# Patient Record
Sex: FEMALE | Race: WHITE | Employment: FULL TIME | URBAN - METROPOLITAN AREA
[De-identification: names, ages, dates, MRNs, and addresses within clinical notes are randomized per-mention and may not be internally consistent; named-entity substitution may affect disease eponyms.]

---

## 2017-07-25 ENCOUNTER — OFFICE VISIT (OUTPATIENT)
Dept: MEDICAL GROUP | Facility: PHYSICIAN GROUP | Age: 52
End: 2017-07-25
Payer: COMMERCIAL

## 2017-07-25 ENCOUNTER — HOSPITAL ENCOUNTER (OUTPATIENT)
Dept: LAB | Facility: MEDICAL CENTER | Age: 52
End: 2017-07-25
Attending: FAMILY MEDICINE
Payer: COMMERCIAL

## 2017-07-25 VITALS
BODY MASS INDEX: 33.06 KG/M2 | TEMPERATURE: 97.9 F | OXYGEN SATURATION: 95 % | DIASTOLIC BLOOD PRESSURE: 92 MMHG | RESPIRATION RATE: 16 BRPM | HEIGHT: 66 IN | WEIGHT: 205.69 LBS | SYSTOLIC BLOOD PRESSURE: 154 MMHG | HEART RATE: 66 BPM

## 2017-07-25 DIAGNOSIS — Z13.220 SCREENING FOR LIPID DISORDERS: ICD-10-CM

## 2017-07-25 DIAGNOSIS — R10.84 GENERALIZED ABDOMINAL PAIN: ICD-10-CM

## 2017-07-25 DIAGNOSIS — Z12.4 CERVICAL CANCER SCREENING: ICD-10-CM

## 2017-07-25 DIAGNOSIS — E66.9 OBESITY (BMI 30-39.9): ICD-10-CM

## 2017-07-25 DIAGNOSIS — Z13.1 SCREENING FOR DIABETES MELLITUS: ICD-10-CM

## 2017-07-25 DIAGNOSIS — Z12.11 SCREENING FOR COLON CANCER: ICD-10-CM

## 2017-07-25 DIAGNOSIS — Z12.39 BREAST CANCER SCREENING: ICD-10-CM

## 2017-07-25 DIAGNOSIS — N95.1 PERIMENOPAUSAL: ICD-10-CM

## 2017-07-25 PROCEDURE — 80061 LIPID PANEL: CPT

## 2017-07-25 PROCEDURE — 84443 ASSAY THYROID STIM HORMONE: CPT

## 2017-07-25 PROCEDURE — 80053 COMPREHEN METABOLIC PANEL: CPT

## 2017-07-25 PROCEDURE — 36415 COLL VENOUS BLD VENIPUNCTURE: CPT

## 2017-07-25 PROCEDURE — 99203 OFFICE O/P NEW LOW 30 MIN: CPT | Performed by: FAMILY MEDICINE

## 2017-07-25 RX ORDER — ESOMEPRAZOLE MAGNESIUM 40 MG/1
40 GRANULE, DELAYED RELEASE ORAL
COMMUNITY
End: 2017-09-29

## 2017-07-25 RX ORDER — FLUTICASONE PROPIONATE 50 MCG
1 SPRAY, SUSPENSION (ML) NASAL 2 TIMES DAILY PRN
COMMUNITY
End: 2021-12-01

## 2017-07-25 RX ORDER — AZELASTINE 1 MG/ML
1 SPRAY, METERED NASAL 2 TIMES DAILY
COMMUNITY
End: 2021-12-01

## 2017-07-25 ASSESSMENT — PATIENT HEALTH QUESTIONNAIRE - PHQ9: CLINICAL INTERPRETATION OF PHQ2 SCORE: 0

## 2017-07-25 NOTE — PROGRESS NOTES
Ana Sherwood is a 52 y.o. female here to establish care and discuss abdominal pain.    HPI: Ana is a pleasant 52-year-old female who is here to establish care. Her previous PCP was in [place]. 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    Generalized abdominal pain  Since her cholecystectomy ~10 years ago, she has had intermittent episodes of abdominal pain. The pain will start somewhere over in her right side and radiate upwards towards her right nipple. It does feel sharp and shooting. Last only for a few seconds to a minute. Denies nausea, vomiting or bowel movement changes. No fever or chills.     She does sometimes have gas and bloating after eating. She is concerned that some of her abdominal muscles may have moved after the surgery.    Perimenopausal  Patient believes that she is going through menopause. Her periods are irregular and she once went 6 months without a period. She's also been having some hot flashes and mood swings. She is unsure when her mother went to menopause.    Current medicines (including changes today)  Current Outpatient Prescriptions   Medication Sig Dispense Refill   • esomeprazole magnesium (NEXIUM) 40 MG Pack Take 40 mg by mouth every morning before breakfast.     • fluticasone (FLONASE) 50 MCG/ACT nasal spray Spray 1 Spray in nose 2 times a day as needed.     • azelastine (ASTELIN) 137 MCG/SPRAY nasal spray [place] 1 Spray in nose 2 times a day.       No current facility-administered medications for this visit.     She  has a past medical history of Uterine fibroid; Allergy; and GERD (gastroesophageal reflux disease). She also has no past medical history of Encounter for long-term (current) use of other medications.  She  has past surgical history that includes bunionectomy; tonsillectomy; and cholecystectomy.  Social History   Substance Use Topics   • Smoking status: Former Smoker     Quit date: 07/18/2017   • Smokeless tobacco: Never Used   • Alcohol Use: Yes  "    Social History     Social History Narrative   • No narrative on file     Family History   Problem Relation Age of Onset   • Hyperlipidemia Mother    • Hypertension Father    • Lung Disease Father    • Cancer Maternal Grandmother      Breast and colon   • Cancer Maternal Grandfather      Prostate and colon   • Cancer Paternal Grandmother      Bone cancer   • Kidney Disease Paternal Grandfather      Family Status   Relation Status Death Age   • Mother Alive    • Father     • Maternal Grandmother     • Maternal Grandfather     • Paternal Grandmother     • Paternal Grandfather       ROS  Constitutional: Negative for fever, chills and malaise/fatigue.   HENT: Negative for congestion.    Eyes: Negative for pain.   Respiratory: Negative for cough and shortness of breath.    Cardiovascular: Negative for leg swelling.   Gastrointestinal: See HPI.  Genitourinary: Negative for dysuria and hematuria.   Skin: Negative for rash.   Neurological: Negative for dizziness, focal weakness and headaches.   Endo/Heme/Allergies: Does not bruise/bleed easily.   Psychiatric/Behavioral: Negative for depression.  The patient is not nervous/anxious.       Objective:     Physical Exam:  Blood pressure 154/92, pulse 66, temperature 36.6 °C (97.9 °F), resp. rate 16, height 1.683 m (5' 6.26\"), weight 93.3 kg (205 lb 11 oz), last menstrual period 2017, SpO2 95 %, not currently breastfeeding. Body mass index is 32.94 kg/(m^2).  Constitutional: Alert, obese, no distress.  Skin: Warm, dry, good turgor, no rashes in visible areas.  Eye: Equal, round and reactive, conjunctiva clear, lids normal.  ENMT: TM's clear bilaterally, lips without lesions, good dentition, oropharynx clear.  Neck: Trachea midline, no masses, no thyromegaly. No cervical or supraclavicular lymphadenopathy.  Respiratory: Unlabored respiratory effort, lungs clear to auscultation, no wheezes, no ronchi.  Cardiovascular: Normal S1, S2, " no murmur, no edema.  Abdomen: Soft, non-tender, no masses, no hepatosplenomegaly.  Psych: Alert and oriented x3, normal affect and mood.    Assessment and Plan:     1. Generalized abdominal pain  This is an ongoing issue for the patient for several years, but is new to me today. Her abdominal exam is reassuring and she does not have any red flags. However, given the chronicity of the pain, we agreed to order an ultrasound to further evaluate. Continue to monitor.  - US-ABDOMEN COMPLETE SURVEY; Future    2. Perimenopausal  Continue to monitor clinically. Advised patient to inform us if she has any heavy vaginal bleeding or any concerning symptoms.    3. Breast cancer screening  Mammogram ordered.  - MA-SCREEN MAMMO W/CAD-BILAT; Future    4. Cervical cancer screening  Records requested for previous Pap smear.    5. Screening for colon cancer  Refer to GI for colonoscopy.  - REFERRAL TO GI FOR COLONOSCOPY    6. Screening for diabetes mellitus  Fasting glucose ordered.  - COMP METABOLIC PANEL; Future    7. Screening for lipid disorders  Fasting cholesterol ordered.  - LIPID PROFILE; Future    8. Obesity (BMI 30-39.9)  Patient's weight was discussed today, including healthy low-carb diet, 30-minutes of moderate exercise daily and avoiding sugars and high-fat foods.  - Patient identified as having weight management issue.  Appropriate orders and counseling given.  - TSH; Future    Records requested from previous gynecologist.  Followup: Return in about 2 months (around 9/25/2017) for f/u results, long if possible.         PLEASE NOTE: This dictation was created using voice recognition software. I have made every reasonable attempt to correct obvious errors, but I expect that there are errors of grammar and possibly content that I did not discover before finalizing the note.

## 2017-07-25 NOTE — ASSESSMENT & PLAN NOTE
Patient believes that she is going through menopause. Her periods are irregular and she once went 6 months without a period. She's also been having some hot flashes and mood swings. She is unsure when her mother went to menopause.

## 2017-07-25 NOTE — Clinical Note
[facility]  Tracy Diaz M.D.  [address]  [place] [address]  Fax: [phone]   Authorization for Release/Disclosure of   Protected Health Information   Name: CRISTOPHER LUCIA : 1965 SSN: [id number]   Address: [address] [place] [address] [place] [address] Phone:    [phone] (home)    I authorize the entity listed below to release/disclose the PHI below to:   [facility]/Tracy Diaz M.D. and Tracy Diaz M.D.   Provider or Entity Name:  Dr. Kenyon  [facility]   Address   [place]   Phone:      Fax:     Reason for request: continuity of care   Information to be released:    [  ] LAST COLONOSCOPY,  including any PATH REPORT and follow-up  [  ] LAST FIT/COLOGUARD RESULT [  ] LAST DEXA  [ x ] LAST MAMMOGRAM  [ x ] LAST PAP  [  ] LAST LABS [  ] RETINA EXAM REPORT  [  ] IMMUNIZATION RECORDS  [  ] Release all info      [  ] Check here and initial the line next to each item to release ALL health information INCLUDING  _____ Care and treatment for drug and / or alcohol abuse  _____ HIV testing, infection status, or AIDS  _____ Genetic Testing    DATES OF SERVICE OR TIME PERIOD TO BE DISCLOSED: _____________  I understand and acknowledge that:  * This Authorization may be revoked at any time by you in writing, except if your health information has already been used or disclosed.  * Your health information that will be used or disclosed as a result of you signing this authorization could be re-disclosed by the recipient. If this occurs, your re-disclosed health information may no longer be protected by State or Federal laws.  * You may refuse to sign this Authorization. Your refusal will not affect your ability to obtain treatment.  * This Authorization becomes effective upon signing and will  on (date) __________.      If no date is indicated, this Authorization will  one (1) year from the signature date.    Name: Cristopher Lucia    Signature:   Date:     7/25/2017       PLEASE FAX REQUESTED RECORDS BACK TO: [phone]

## 2017-07-25 NOTE — ASSESSMENT & PLAN NOTE
Since her cholecystectomy ~10 years ago, she has had intermittent episodes of abdominal pain. The pain will start somewhere over in her right side and radiate upwards towards her right nipple. It does feel sharp and shooting. Last only for a few seconds to a minute. Denies nausea, vomiting or bowel movement changes. No fever or chills.     She does sometimes have gas and bloating after eating. She is concerned that some of her abdominal muscles may have moved after the surgery.

## 2017-07-25 NOTE — MR AVS SNAPSHOT
"        Ana Sherwood   2017 9:20 AM   Office Visit   MRN: [id number]    Department:  [facility]   Dept Phone:  [phone]    Description:  Female : 1965   Provider:  Tracy Diaz M.D.           Reason for Visit     Referral Needed PAP, mammogram, colonoscopy, lab order request     Nicotine Dependence           Allergies as of 2017     Allergen Noted Reactions    Codeine 2017   Vomiting, Nausea    Vicodin [Hydrocodone-Acetaminophen] 2017   Nausea    Small doses with food - patient tolerates       You were diagnosed with     Generalized abdominal pain   [173929]       Perimenopausal   [655171]       Breast cancer screening   [089873]       Cervical cancer screening   [510119]       Screening for colon cancer   [151463]       Screening for diabetes mellitus   [V77.1.ICD-9-CM]       Screening for lipid disorders   [9043866]       Obesity (BMI 30-39.9)   [361915]         Vital Signs     Blood Pressure Pulse Temperature Respirations Height Weight    154/92 mmHg 66 36.6 °C (97.9 °F) 16 1.683 m (5' 6.26\") 93.3 kg (205 lb 11 oz)    Body Mass Index Oxygen Saturation Last Menstrual Period Breastfeeding? Smoking Status       32.94 kg/m2 95% 2017 No Former Smoker       Basic Information     Date Of Birth Sex Race Ethnicity Preferred Language    1965 Female White Non- English      Your appointments     Sep 29, 2017  4:20 PM   Established Patient with Tracy Diaz M.D.   [facility] (--)    [address]  [address]  [facility] [id number] [phone]           You will be receiving a confirmation call a few days before your appointment from our automated call confirmation system.              Problem List              ICD-10-CM Priority Class Noted - Resolved    Perimenopausal N95.1   2017 - Present    Obesity (BMI 30-39.9) E66.9   2017 - Present    Generalized abdominal pain R10.84   2017 - Present      Health Maintenance        Date " Due Completion Dates    PAP SMEAR 1/31/1988 ---    MAMMOGRAM 1/31/2007 ---    COLONOSCOPY 1/31/2017 ---    IMM DTaP/Tdap/Td Vaccine (1 - Tdap) 7/27/2018 (Originally 1/31/1986) ---    IMM INFLUENZA (1) 9/1/2017 ---            Current Immunizations     No immunizations on file.      Below and/or attached are the medications your provider expects you to take. Review all of your home medications and newly ordered medications with your provider and/or pharmacist. Follow medication instructions as directed by your provider and/or pharmacist. Please keep your medication list with you and share with your provider. Update the information when medications are discontinued, doses are changed, or new medications (including over-the-counter products) are added; and carry medication information at all times in the event of emergency situations     Allergies:  CODEINE - Vomiting,Nausea     VICODIN - Nausea               Medications  Valid as of: July 25, 2017 -  9:58 AM    Generic Name Brand Name Tablet Size Instructions for use    Azelastine HCl (Solution) ASTELIN 137 MCG/SPRAY Spray 1 Spray in nose 2 times a day.        Esomeprazole Magnesium (Pack) NEXIUM 40 MG Take 40 mg by mouth every morning before breakfast.        Fluticasone Propionate (Suspension) FLONASE 50 MCG/ACT Spray 1 Spray in nose 2 times a day as needed.        .                 Medicines prescribed today were sent to:     None      Medication refill instructions:       If your prescription bottle indicates you have medication refills left, it is not necessary to call your provider’s office. Please contact your pharmacy and they will refill your medication.    If your prescription bottle indicates you do not have any refills left, you may request refills at any time through one of the following ways: The online [employer] system (except Urgent Care), by calling your provider’s office, or by asking your pharmacy to contact your provider’s office with a refill  request. Medication refills are processed only during regular business hours and may not be available until the next business day. Your provider may request additional information or to have a follow-up visit with you prior to refilling your medication.   *Please Note: Medication refills are assigned a new Rx number when refilled electronically. Your pharmacy may indicate that no refills were authorized even though a new prescription for the same medication is available at the pharmacy. Please request the medicine by name with the pharmacy before contacting your provider for a refill.        Your To Do List     Future Labs/Procedures Complete By Expires    COMP METABOLIC PANEL  As directed 7/26/2018    LIPID PROFILE  As directed 7/26/2018    MA-SCREEN MAMMO W/CAD-BILAT  As directed 8/26/2018    TSH  As directed 7/26/2018    US-ABDOMEN COMPLETE SURVEY  As directed 1/25/2018      Referral     A referral request has been sent to our patient care coordination department. Please allow 3-5 business days for us to process this request and contact you either by phone or mail. If you do not hear from us by the 5th business day, please call us at [phone].           [employer] Access Code: Activation code not generated  Current [employer] Status: Active

## 2017-07-26 LAB
ALBUMIN SERPL BCP-MCNC: 4.3 G/DL (ref 3.2–4.9)
ALBUMIN/GLOB SERPL: 1.3 G/DL
ALP SERPL-CCNC: 71 U/L (ref 30–99)
ALT SERPL-CCNC: 28 U/L (ref 2–50)
ANION GAP SERPL CALC-SCNC: 11 MMOL/L (ref 0–11.9)
AST SERPL-CCNC: 30 U/L (ref 12–45)
BILIRUB SERPL-MCNC: 0.6 MG/DL (ref 0.1–1.5)
BUN SERPL-MCNC: 16 MG/DL (ref 8–22)
CALCIUM SERPL-MCNC: 9.8 MG/DL (ref 8.5–10.5)
CHLORIDE SERPL-SCNC: 102 MMOL/L (ref 96–112)
CHOLEST SERPL-MCNC: 237 MG/DL (ref 100–199)
CO2 SERPL-SCNC: 22 MMOL/L (ref 20–33)
CREAT SERPL-MCNC: 0.62 MG/DL (ref 0.5–1.4)
GFR SERPL CREATININE-BSD FRML MDRD: >60 ML/MIN/1.73 M 2
GLOBULIN SER CALC-MCNC: 3.2 G/DL (ref 1.9–3.5)
GLUCOSE SERPL-MCNC: 67 MG/DL (ref 65–99)
HDLC SERPL-MCNC: 52 MG/DL
LDLC SERPL CALC-MCNC: ABNORMAL MG/DL
POTASSIUM SERPL-SCNC: 4.1 MMOL/L (ref 3.6–5.5)
PROT SERPL-MCNC: 7.5 G/DL (ref 6–8.2)
SODIUM SERPL-SCNC: 135 MMOL/L (ref 135–145)
TRIGL SERPL-MCNC: 453 MG/DL (ref 0–149)
TSH SERPL DL<=0.005 MIU/L-ACNC: 2.61 UIU/ML (ref 0.3–3.7)

## 2017-09-12 ENCOUNTER — HOSPITAL ENCOUNTER (OUTPATIENT)
Dept: RADIOLOGY | Facility: MEDICAL CENTER | Age: 52
End: 2017-09-12
Attending: FAMILY MEDICINE
Payer: COMMERCIAL

## 2017-09-12 DIAGNOSIS — Z12.31 VISIT FOR SCREENING MAMMOGRAM: ICD-10-CM

## 2017-09-12 PROCEDURE — G0202 SCR MAMMO BI INCL CAD: HCPCS

## 2017-09-15 ENCOUNTER — HOSPITAL ENCOUNTER (OUTPATIENT)
Dept: RADIOLOGY | Facility: MEDICAL CENTER | Age: 52
End: 2017-09-15

## 2017-09-15 ENCOUNTER — HOSPITAL ENCOUNTER (OUTPATIENT)
Dept: RADIOLOGY | Facility: MEDICAL CENTER | Age: 52
End: 2017-09-15
Attending: INTERNAL MEDICINE
Payer: COMMERCIAL

## 2017-09-15 DIAGNOSIS — D12.0 BENIGN NEOPLASM OF CECUM: ICD-10-CM

## 2017-09-15 PROCEDURE — 74177 CT ABD & PELVIS W/CONTRAST: CPT

## 2017-09-15 PROCEDURE — 700117 HCHG RX CONTRAST REV CODE 255: Performed by: INTERNAL MEDICINE

## 2017-09-15 RX ADMIN — IOHEXOL 100 ML: 350 INJECTION, SOLUTION INTRAVENOUS at 18:17

## 2017-09-29 ENCOUNTER — OFFICE VISIT (OUTPATIENT)
Dept: MEDICAL GROUP | Facility: PHYSICIAN GROUP | Age: 52
End: 2017-09-29
Payer: COMMERCIAL

## 2017-09-29 VITALS
HEIGHT: 66 IN | WEIGHT: 211 LBS | SYSTOLIC BLOOD PRESSURE: 146 MMHG | TEMPERATURE: 98.2 F | OXYGEN SATURATION: 96 % | HEART RATE: 69 BPM | BODY MASS INDEX: 33.91 KG/M2 | DIASTOLIC BLOOD PRESSURE: 86 MMHG

## 2017-09-29 DIAGNOSIS — E78.00 PURE HYPERCHOLESTEROLEMIA: ICD-10-CM

## 2017-09-29 DIAGNOSIS — R10.84 GENERALIZED ABDOMINAL PAIN: ICD-10-CM

## 2017-09-29 DIAGNOSIS — K21.9 GASTROESOPHAGEAL REFLUX DISEASE WITHOUT ESOPHAGITIS: ICD-10-CM

## 2017-09-29 DIAGNOSIS — K63.5 HYPERPLASTIC POLYP OF DESCENDING COLON: ICD-10-CM

## 2017-09-29 PROCEDURE — 99214 OFFICE O/P EST MOD 30 MIN: CPT | Performed by: FAMILY MEDICINE

## 2017-09-29 RX ORDER — RANITIDINE 150 MG/1
150 TABLET ORAL 2 TIMES DAILY
Qty: 60 TAB | Refills: 0 | Status: SHIPPED | OUTPATIENT
Start: 2017-09-29 | End: 2017-12-29

## 2017-09-29 NOTE — ASSESSMENT & PLAN NOTE
Patient has not had any pain since her last appointment. She had a colonoscopy and had two polyps removed. Pathology came back as hyperplastic polyps. Due to the location of the polyps, she had a CT abdomen/pelvis done which was unremarkable. As she had a CT, she did not have her ultrasound.     Again, she mentions that her pain has resolved. She thinks it was musculoskeletal as it improved once she resumed working out at the gym.

## 2017-10-01 NOTE — ASSESSMENT & PLAN NOTE
Patient's blood pressure is noted to be elevated. It was also high at her appointment in July. She tells me that when she checks it at "[employer] on their machine it is normal. She had [employer] for breakfast this morning.    Denies headaches, vision changes, chest pain or shortness of breath.

## 2017-10-01 NOTE — PROGRESS NOTES
Subjective:   Cristopher Sherwood is a 52 y.o. female here today for follow-up abdominal pain and lab results.    Generalized abdominal pain  Patient has not had any pain since her last appointment. She had a colonoscopy and had two polyps removed. Pathology came back as hyperplastic polyps. Due to the location of the polyps, she had a CT abdomen/pelvis done which was unremarkable. As she had a CT, she did not have her ultrasound.     Again, she mentions that her pain has resolved. She thinks it was musculoskeletal as it improved once she resumed working out at the gym.    Elevated blood pressure  Patient's blood pressure is noted to be elevated. It was also high at her appointment in July. She tells me that when she checks it at [employer] on their machine it is normal. She had [employer] for breakfast this morning.    Denies headaches, vision changes, chest pain or shortness of breath.    Gastroesophageal reflux disease without esophagitis  Taking medication daily. No early satiety, unintentional weight loss, choking, persistent burning pain in chest or upper abdomen. She is requesting a refill of esomeprazole today.    Pure hypercholesterolemia  This is a new problem. We reviewed patient's lipid panel which was abnormal. Denies personal history of heart attack or stroke. She has started to smoke again.    Results for CRISTOPHER SHERWOOD (MRN [id number]) as of 9/30/2017 20:06   Ref. Range 7/25/2017 10:06   Cholesterol,Tot Latest Ref Range: 100 - 199 mg/dL 237 (H)   Triglycerides Latest Ref Range: 0 - 149 mg/dL 453 (H)   HDL Latest Ref Range: >=40 mg/dL 52   LDL Latest Ref Range: <100 mg/dL see below      Current medicines (including changes today)  Current Outpatient Prescriptions   Medication Sig Dispense Refill   • ranitidine (ZANTAC) 150 MG Tab Take 1 Tab by mouth 2 times a day. 60 Tab 0   • fluticasone (FLONASE) 50 MCG/ACT nasal spray Spray 1 Spray in nose 2 times a day as needed.     • azelastine (ASTELIN)  "137 MCG/SPRAY nasal spray [place] 1 Spray in nose 2 times a day.       No current facility-administered medications for this visit.      She  has a past medical history of Allergy; GERD (gastroesophageal reflux disease); and Uterine fibroid. She also has no past medical history of Encounter for long-term (current) use of other medications.    ROS   See HPI. No chest pain, no shortness of breath, no abdominal pain.     Objective:     Physical Exam:  Blood pressure 146/86, pulse 69, temperature 36.8 °C (98.2 °F), height 1.676 m (5' 6\"), weight 95.7 kg (211 lb), SpO2 96 %. Body mass index is 34.06 kg/m².   Constitutional: Alert, obese, no distress.  Skin: Warm, dry, good turgor, no rashes in visible areas.  Eye: Equal, round and reactive, conjunctiva clear, lids normal.  ENMT: Lips without lesions, good dentition, oropharynx clear.  Neck: Trachea midline, no masses, no thyromegaly.  Respiratory: Unlabored respiratory effort, lungs clear to auscultation, no wheezes, no ronchi.  Cardiovascular: Normal S1, S2, no murmur, no edema.  Abdomen: Soft, non-tender, no masses, no hepatosplenomegaly.  Psych: Alert and oriented x3, normal affect and mood.    Assessment and Plan:     1. Generalized abdominal pain  Resolved. Reviewed unremarkable work-up.    2. Hyperplastic polyp of descending colon  Two hyperplastic polyps removed during colonoscopy. Recall in 5 years.    3. Elevated blood pressure  BP remained elevated above goal on recheck. Lengthy discussion with patient regarding DASH diet, importance of exercise and smoking cessation. Close follow-up. If BP >140/80, will plan to start lisinopril.    4. Gastroesophageal reflux disease without esophagitis  Chronic and stable. Transition to H2 blocker and monitor.  - ranitidine (ZANTAC) 150 MG Tab; Take 1 Tab by mouth 2 times a day.  Dispense: 60 Tab; Refill: 0    5. Pure hypercholesterolemia  This is a new problem. Her 10-year ASCVD risk is 2.5 to 8.5% (depending on tobacco use and " elevated BP). Close follow-up to recheck BP and re-assess risk.    Followup: Return in about 2 months (around 11/29/2017) for f/u blood pressure, short.         PLEASE NOTE: This dictation was created using voice recognition software. I have made every reasonable attempt to correct obvious errors, but I expect that there are errors of grammar and possibly content that I did not discover before finalizing the note.

## 2017-10-01 NOTE — ASSESSMENT & PLAN NOTE
This is a new problem. We reviewed patient's lipid panel which was abnormal. Denies personal history of heart attack or stroke. She has started to smoke again.    Results for CRISTOPHER LUCIA (MRN [id number]) as of 9/30/2017 20:06   Ref. Range 7/25/2017 10:06   Cholesterol,Tot Latest Ref Range: 100 - 199 mg/dL 237 (H)   Triglycerides Latest Ref Range: 0 - 149 mg/dL 453 (H)   HDL Latest Ref Range: >=40 mg/dL 52   LDL Latest Ref Range: <100 mg/dL see below

## 2017-10-01 NOTE — ASSESSMENT & PLAN NOTE
Taking medication daily. No early satiety, unintentional weight loss, choking, persistent burning pain in chest or upper abdomen. She is requesting a refill of esomeprazole today.

## 2017-12-29 ENCOUNTER — OFFICE VISIT (OUTPATIENT)
Dept: MEDICAL GROUP | Facility: PHYSICIAN GROUP | Age: 52
End: 2017-12-29
Payer: COMMERCIAL

## 2017-12-29 VITALS
HEART RATE: 64 BPM | SYSTOLIC BLOOD PRESSURE: 136 MMHG | DIASTOLIC BLOOD PRESSURE: 84 MMHG | OXYGEN SATURATION: 96 % | RESPIRATION RATE: 14 BRPM | HEIGHT: 66 IN | TEMPERATURE: 98.9 F | WEIGHT: 217 LBS | BODY MASS INDEX: 34.87 KG/M2

## 2017-12-29 DIAGNOSIS — E78.00 PURE HYPERCHOLESTEROLEMIA: ICD-10-CM

## 2017-12-29 DIAGNOSIS — K21.9 GASTROESOPHAGEAL REFLUX DISEASE WITHOUT ESOPHAGITIS: ICD-10-CM

## 2017-12-29 DIAGNOSIS — E66.9 OBESITY (BMI 30-39.9): ICD-10-CM

## 2017-12-29 DIAGNOSIS — I10 ESSENTIAL HYPERTENSION: ICD-10-CM

## 2017-12-29 PROBLEM — R10.84 GENERALIZED ABDOMINAL PAIN: Status: RESOLVED | Noted: 2017-07-25 | Resolved: 2017-12-29

## 2017-12-29 PROCEDURE — 99214 OFFICE O/P EST MOD 30 MIN: CPT | Performed by: FAMILY MEDICINE

## 2017-12-29 RX ORDER — LOSARTAN POTASSIUM 25 MG/1
25 TABLET ORAL DAILY
Qty: 30 TAB | Refills: 2 | Status: SHIPPED | OUTPATIENT
Start: 2017-12-29 | End: 2018-02-11 | Stop reason: SDUPTHER

## 2017-12-29 RX ORDER — ESOMEPRAZOLE MAGNESIUM 40 MG/1
40 CAPSULE, DELAYED RELEASE ORAL
Qty: 90 CAP | Refills: 3 | Status: SHIPPED | OUTPATIENT
Start: 2017-12-29 | End: 2017-12-29 | Stop reason: SDUPTHER

## 2017-12-29 RX ORDER — ESOMEPRAZOLE MAGNESIUM 40 MG/1
40 CAPSULE, DELAYED RELEASE ORAL
Qty: 30 CAP | Refills: 2 | Status: SHIPPED | OUTPATIENT
Start: 2017-12-29 | End: 2018-02-11 | Stop reason: SDUPTHER

## 2017-12-29 ASSESSMENT — PAIN SCALES - GENERAL: PAINLEVEL: NO PAIN

## 2017-12-30 NOTE — ASSESSMENT & PLAN NOTE
Stable. We discussed starting a cholesterol-lowering medication as we will be starting a medication for blood pressure. Patient is hesitant to start a cholesterol medication as she has had some side effects in the past. She also mentions that her cholesterol numbers looked worse on treatment. Denies personal history of heart attack or stroke.    Results for CRISTOPHER LUCIA (MRN [id number]) as of 9/30/2017 20:06   Ref. Range 7/25/2017 10:06   Cholesterol,Tot Latest Ref Range: 100 - 199 mg/dL 237 (H)   Triglycerides Latest Ref Range: 0 - 149 mg/dL 453 (H)   HDL Latest Ref Range: >=40 mg/dL 52   LDL Latest Ref Range: <100 mg/dL see below

## 2017-12-30 NOTE — ASSESSMENT & PLAN NOTE
"Patient's blood pressure is noted to be elevated today. She tells me that she has been checking at outside the office at her local pharmacy and it has been \"high.\" She states that she has been getting some readings into the 150s-160 systolic. She has also been having some headaches which are new for her.    A few years ago, she was under a great detail of stress at work and was having issues with her blood pressure. However, she has never needed to go on medication and has never been diagnosed with hypertension previously.  "

## 2017-12-30 NOTE — ASSESSMENT & PLAN NOTE
"Stable. Patient tried taking ranitidine without much improvement in her symptoms. Instead, she noticed having \"bad burps\" and stopped the medication. She has restarted over-the-counter Nexium and requests a prescription. No early satiety, unintentional weight loss, choking, persistent burning pain in chest or upper abdomen.    "

## 2017-12-30 NOTE — PROGRESS NOTES
"Subjective:   Cristopher Sherwood is a 52 y.o. female here today for blood pressure check and follow-up acid reflux.    Essential hypertension  Patient's blood pressure is noted to be elevated today. She tells me that she has been checking at outside the office at her local pharmacy and it has been \"high.\" She states that she has been getting some readings into the 150s-160 systolic. She has also been having some headaches which are new for her.    A few years ago, she was under a great detail of stress at work and was having issues with her blood pressure. However, she has never needed to go on medication and has never been diagnosed with hypertension previously.    Gastroesophageal reflux disease without esophagitis  Stable. Patient tried taking ranitidine without much improvement in her symptoms. Instead, she noticed having \"bad burps\" and stopped the medication. She has restarted over-the-counter Nexium and requests a prescription. No early satiety, unintentional weight loss, choking, persistent burning pain in chest or upper abdomen.    Pure hypercholesterolemia  Stable. We discussed starting a cholesterol-lowering medication as we will be starting a medication for blood pressure. Patient is hesitant to start a cholesterol medication as she has had some side effects in the past. She also mentions that her cholesterol numbers looked worse on treatment. Denies personal history of heart attack or stroke.    Results for CRISTOPHER SHERWOOD (MRN [id number]) as of 9/30/2017 20:06   Ref. Range 7/25/2017 10:06   Cholesterol,Tot Latest Ref Range: 100 - 199 mg/dL 237 (H)   Triglycerides Latest Ref Range: 0 - 149 mg/dL 453 (H)   HDL Latest Ref Range: >=40 mg/dL 52   LDL Latest Ref Range: <100 mg/dL see below      Current medicines (including changes today)  Current Outpatient Prescriptions   Medication Sig Dispense Refill   • losartan (COZAAR) 25 MG Tab Take 1 Tab by mouth every day. 30 Tab 2   • esomeprazole (NEXIUM) " "40 MG delayed-release capsule Take 1 Cap by mouth every morning before breakfast. 30 Cap 2   • fluticasone (FLONASE) 50 MCG/ACT nasal spray Spray 1 Spray in nose 2 times a day as needed.     • azelastine (ASTELIN) 137 MCG/SPRAY nasal spray [employer] 1 Spray in nose 2 times a day.       No current facility-administered medications for this visit.      She  has a past medical history of Allergy; GERD (gastroesophageal reflux disease); and Uterine fibroid. She also has no past medical history of Encounter for long-term (current) use of other medications.    ROS   See HPI. No chest pain, no shortness of breath, no abdominal pain.     Objective:     Physical Exam:  Blood pressure 136/84, pulse 64, temperature 37.2 °C (98.9 °F), resp. rate 14, height 1.676 m (5' 6\"), weight 98.4 kg (217 lb), last menstrual period 12/20/2017, SpO2 96 %, not currently breastfeeding. Body mass index is 35.02 kg/m².   Repeat blood pressure 148/92.  Constitutional: Alert, overweight, no distress.  Skin: Warm, dry, good turgor, no rashes in visible areas.  Eye: Equal, round and reactive, conjunctiva clear, lids normal.  ENMT: Lips without lesions, good dentition, oropharynx clear.  Neck: Trachea midline, no masses, no thyromegaly.  Respiratory: Unlabored respiratory effort, lungs clear to auscultation, no wheezes, no ronchi.  Cardiovascular: Normal S1, S2, no murmur, no edema.  Psych: Alert and oriented x3, normal affect and mood.    Assessment and Plan:     1. Essential hypertension  This is a new problem. Patient's blood pressure is elevated in the office and she reports high numbers outside as well. Start low-dose of losartan. Discussed decreasing salt intake. Emphasized benefits of exercise and diet. Continue to monitor and follow-up in office in 6 weeks.  - losartan (COZAAR) 25 MG Tab; Take 1 Tab by mouth every day.  Dispense: 30 Tab; Refill: 2  - COMP METABOLIC PANEL; Future    2. Gastroesophageal reflux disease without esophagitis  This is a " chronic and stable problem. Patient is doing well. She did not tolerate H2-blocker trial. No red flags. Continue PPI and monitor.  - esomeprazole (NEXIUM) 40 MG delayed-release capsule; Take 1 Cap by mouth every morning before breakfast. Dispense: 30 Cap; Refill: 2    3. Pure hypercholesterolemia  Stable. Her 10-year ASCVD risk is 7%, possibly higher as patient's triglycerides were elevated and LDL could not be calculated. I recommend she start statin therapy, but she declines today. We agreed to treat with lifestyle modifications and recheck lipid panel before next appointment.   - LIPID PROFILE; Future    4. Obesity (BMI 30-39.9)  Patient's weight was discussed today, including healthy low-carb diet, 30-minutes of moderate exercise daily and avoiding sugars and high-fat foods.  - Patient identified as having weight management issue.  Appropriate orders and counseling given.    Followup: Return in about 6 weeks (around 2/9/2018) for HTN, lab results, short.         PLEASE NOTE: This dictation was created using voice recognition software. I have made every reasonable attempt to correct obvious errors, but I expect that there are errors of grammar and possibly content that I did not discover before finalizing the note.

## 2018-02-09 ENCOUNTER — APPOINTMENT (OUTPATIENT)
Dept: MEDICAL GROUP | Facility: PHYSICIAN GROUP | Age: 53
End: 2018-02-09
Payer: COMMERCIAL

## 2018-02-09 ENCOUNTER — TELEPHONE (OUTPATIENT)
Dept: MEDICAL GROUP | Facility: PHYSICIAN GROUP | Age: 53
End: 2018-02-09

## 2018-02-10 NOTE — TELEPHONE ENCOUNTER
Patient had an apt with [facility] for 2/9, but was 20 minutes late and unable to be seen. We rescheduled her apt for 3/30 with Dr. Diaz. However the patient is concerned they won't have enough BP medication and was wanting a refill. Please advice and call patient if they need to be seen sooner. Thank you!

## 2018-02-11 DIAGNOSIS — I10 ESSENTIAL HYPERTENSION: ICD-10-CM

## 2018-02-11 DIAGNOSIS — K21.9 GASTROESOPHAGEAL REFLUX DISEASE WITHOUT ESOPHAGITIS: ICD-10-CM

## 2018-02-11 RX ORDER — ESOMEPRAZOLE MAGNESIUM 40 MG/1
40 CAPSULE, DELAYED RELEASE ORAL
Qty: 30 CAP | Refills: 2 | Status: SHIPPED | OUTPATIENT
Start: 2018-02-11 | End: 2018-07-29 | Stop reason: SDUPTHER

## 2018-02-11 RX ORDER — LOSARTAN POTASSIUM 25 MG/1
25 TABLET ORAL DAILY
Qty: 30 TAB | Refills: 2 | Status: SHIPPED | OUTPATIENT
Start: 2018-02-11 | End: 2018-03-30

## 2018-03-30 ENCOUNTER — OFFICE VISIT (OUTPATIENT)
Dept: MEDICAL GROUP | Facility: PHYSICIAN GROUP | Age: 53
End: 2018-03-30
Payer: COMMERCIAL

## 2018-03-30 VITALS
WEIGHT: 215 LBS | SYSTOLIC BLOOD PRESSURE: 144 MMHG | OXYGEN SATURATION: 97 % | RESPIRATION RATE: 18 BRPM | DIASTOLIC BLOOD PRESSURE: 80 MMHG | TEMPERATURE: 98.6 F | BODY MASS INDEX: 34.55 KG/M2 | HEART RATE: 66 BPM | HEIGHT: 66 IN

## 2018-03-30 DIAGNOSIS — I10 ESSENTIAL HYPERTENSION: ICD-10-CM

## 2018-03-30 DIAGNOSIS — J30.89 ENVIRONMENTAL AND SEASONAL ALLERGIES: ICD-10-CM

## 2018-03-30 DIAGNOSIS — E78.00 PURE HYPERCHOLESTEROLEMIA: ICD-10-CM

## 2018-03-30 PROCEDURE — 99214 OFFICE O/P EST MOD 30 MIN: CPT | Performed by: FAMILY MEDICINE

## 2018-03-30 RX ORDER — LOSARTAN POTASSIUM 50 MG/1
50 TABLET ORAL DAILY
Qty: 90 TAB | Refills: 3 | Status: SHIPPED | OUTPATIENT
Start: 2018-03-30 | End: 2019-04-07 | Stop reason: SDUPTHER

## 2018-03-30 ASSESSMENT — PAIN SCALES - GENERAL: PAINLEVEL: NO PAIN

## 2018-03-30 NOTE — ASSESSMENT & PLAN NOTE
Elevated. Monitoring BP at home. She tells me that her BP have been in the high 140's at home. Currently taking losartan 25mg daily as directed. Also taking baby aspirin. Denies lightheadedness, vision changes, headache, palpitations or leg swelling.

## 2018-03-30 NOTE — ASSESSMENT & PLAN NOTE
"Patient says she has had the \"funk\" a couple times this year. She was treated for bronchitis at work. Has multiple allergies and recently restarted her Flonase.  "

## 2018-03-30 NOTE — ASSESSMENT & PLAN NOTE
Stable. At previous visits, we discussed starting a cholesterol-lowering medication. Patient is hesitant to start a cholesterol medication as she has had some side effects in the past. Denies personal history of heart attack or stroke.    We reviewed her most recent results.    Results for KHARI CRISTOPHER PEREZ (MRN [id number]) as of 9/30/2017 20:06   Ref. Range 7/25/2017 10:06   Cholesterol,Tot Latest Ref Range: 100 - 199 mg/dL 237 (H)   Triglycerides Latest Ref Range: 0 - 149 mg/dL 453 (H)   HDL Latest Ref Range: >=40 mg/dL 52   LDL Latest Ref Range: <100 mg/dL see below

## 2018-07-29 DIAGNOSIS — K21.9 GASTROESOPHAGEAL REFLUX DISEASE WITHOUT ESOPHAGITIS: ICD-10-CM

## 2018-07-30 RX ORDER — ESOMEPRAZOLE MAGNESIUM 40 MG/1
CAPSULE, DELAYED RELEASE ORAL
Qty: 30 CAP | Refills: 11 | Status: SHIPPED | OUTPATIENT
Start: 2018-07-30 | End: 2021-12-10 | Stop reason: SDUPTHER

## 2018-10-25 ENCOUNTER — HOSPITAL ENCOUNTER (OUTPATIENT)
Dept: LAB | Facility: MEDICAL CENTER | Age: 53
End: 2018-10-25
Attending: FAMILY MEDICINE
Payer: COMMERCIAL

## 2018-10-25 ENCOUNTER — TELEPHONE (OUTPATIENT)
Dept: MEDICAL GROUP | Facility: PHYSICIAN GROUP | Age: 53
End: 2018-10-25

## 2018-10-25 DIAGNOSIS — J30.89 ENVIRONMENTAL AND SEASONAL ALLERGIES: ICD-10-CM

## 2018-10-25 DIAGNOSIS — I10 ESSENTIAL HYPERTENSION: ICD-10-CM

## 2018-10-25 DIAGNOSIS — E78.00 PURE HYPERCHOLESTEROLEMIA: ICD-10-CM

## 2018-10-25 LAB
ALBUMIN SERPL BCP-MCNC: 4.8 G/DL (ref 3.2–4.9)
ALBUMIN/GLOB SERPL: 1.7 G/DL
ALP SERPL-CCNC: 69 U/L (ref 30–99)
ALT SERPL-CCNC: 67 U/L (ref 2–50)
ANION GAP SERPL CALC-SCNC: 7 MMOL/L (ref 0–11.9)
AST SERPL-CCNC: 48 U/L (ref 12–45)
BILIRUB SERPL-MCNC: 0.7 MG/DL (ref 0.1–1.5)
BUN SERPL-MCNC: 18 MG/DL (ref 8–22)
CALCIUM SERPL-MCNC: 10 MG/DL (ref 8.5–10.5)
CHLORIDE SERPL-SCNC: 103 MMOL/L (ref 96–112)
CHOLEST SERPL-MCNC: 296 MG/DL (ref 100–199)
CO2 SERPL-SCNC: 27 MMOL/L (ref 20–33)
CREAT SERPL-MCNC: 0.72 MG/DL (ref 0.5–1.4)
FASTING STATUS PATIENT QL REPORTED: NORMAL
GLOBULIN SER CALC-MCNC: 2.9 G/DL (ref 1.9–3.5)
GLUCOSE SERPL-MCNC: 96 MG/DL (ref 65–99)
HDLC SERPL-MCNC: 49 MG/DL
LDLC SERPL CALC-MCNC: 186 MG/DL
POTASSIUM SERPL-SCNC: 3.8 MMOL/L (ref 3.6–5.5)
PROT SERPL-MCNC: 7.7 G/DL (ref 6–8.2)
SODIUM SERPL-SCNC: 137 MMOL/L (ref 135–145)
TRIGL SERPL-MCNC: 307 MG/DL (ref 0–149)

## 2018-10-25 PROCEDURE — 80061 LIPID PANEL: CPT

## 2018-10-25 PROCEDURE — 36415 COLL VENOUS BLD VENIPUNCTURE: CPT

## 2018-10-25 PROCEDURE — 80053 COMPREHEN METABOLIC PANEL: CPT

## 2018-10-25 NOTE — TELEPHONE ENCOUNTER
Phone Number Called: [phone] (home)     Message: LVM to call back.     Left Message for patient to call back: yes

## 2018-10-25 NOTE — TELEPHONE ENCOUNTER
Pt came into the clinic to have labs drawn but the order was not for an annual and pt didn't want to be billed. She went ahead and got her blood drawn in hopes it can be changed. Please call with any questions.

## 2018-10-25 NOTE — TELEPHONE ENCOUNTER
"Noted.  Diagnoses on ordered labs cannot be changed as they are for established problems \"essential hypertension\" and \"pure hypercholesterolemia\".  Please refer to March 2018 note.  -Tracy Diaz M.D.    "

## 2018-10-25 NOTE — TELEPHONE ENCOUNTER
Patient returned call and states she has never been billed in the past for annual labs and has had the same diagnoses for years.  I did inform her that this is on her problem list and was discussed during last office visit therefore this will be used as diagnosis.  I suggested that patient not sign ABN form and contact us prior to having labs done next time otherwise she will be responsible for any bills that may accrue. Patient states she will discuss labs with PCP on Friday.

## 2018-10-29 RX ORDER — CYANOCOBALAMIN 1000 UG/ML
1000 INJECTION, SOLUTION INTRAMUSCULAR; SUBCUTANEOUS ONCE
Status: CANCELLED | OUTPATIENT
Start: 2018-10-29 | End: 2018-10-30

## 2018-10-29 NOTE — PROGRESS NOTES
I've never discussed B12 deficiency with this patient.  Please check this order.  -Tracy Diaz M.D.

## 2018-11-02 ENCOUNTER — OFFICE VISIT (OUTPATIENT)
Dept: MEDICAL GROUP | Facility: PHYSICIAN GROUP | Age: 53
End: 2018-11-02
Payer: COMMERCIAL

## 2018-11-02 ENCOUNTER — HOSPITAL ENCOUNTER (OUTPATIENT)
Dept: RADIOLOGY | Facility: MEDICAL CENTER | Age: 53
End: 2018-11-02
Attending: FAMILY MEDICINE
Payer: COMMERCIAL

## 2018-11-02 VITALS
RESPIRATION RATE: 16 BRPM | TEMPERATURE: 99.1 F | DIASTOLIC BLOOD PRESSURE: 62 MMHG | WEIGHT: 215 LBS | OXYGEN SATURATION: 96 % | HEART RATE: 74 BPM | HEIGHT: 66 IN | BODY MASS INDEX: 34.55 KG/M2 | SYSTOLIC BLOOD PRESSURE: 100 MMHG

## 2018-11-02 DIAGNOSIS — Z98.890 HISTORY OF FOOT SURGERY: ICD-10-CM

## 2018-11-02 DIAGNOSIS — Z12.39 SCREENING BREAST EXAMINATION: ICD-10-CM

## 2018-11-02 DIAGNOSIS — K21.9 GASTROESOPHAGEAL REFLUX DISEASE WITHOUT ESOPHAGITIS: ICD-10-CM

## 2018-11-02 DIAGNOSIS — Z00.00 PREVENTATIVE HEALTH CARE: ICD-10-CM

## 2018-11-02 DIAGNOSIS — I10 ESSENTIAL HYPERTENSION: ICD-10-CM

## 2018-11-02 DIAGNOSIS — E78.00 PURE HYPERCHOLESTEROLEMIA: ICD-10-CM

## 2018-11-02 DIAGNOSIS — M79.671 RIGHT FOOT PAIN: ICD-10-CM

## 2018-11-02 PROCEDURE — 77067 SCR MAMMO BI INCL CAD: CPT

## 2018-11-02 PROCEDURE — 99214 OFFICE O/P EST MOD 30 MIN: CPT | Performed by: FAMILY MEDICINE

## 2018-11-02 ASSESSMENT — PATIENT HEALTH QUESTIONNAIRE - PHQ9: CLINICAL INTERPRETATION OF PHQ2 SCORE: 0

## 2018-11-03 NOTE — PROGRESS NOTES
"Subjective:   Ana Sherwood is a 53 y.o. female here today for follow-up hypertension, lab results and right foot pain.    Essential hypertension  Stable.  Patient's blood pressure is slightly low today.  Monitoring BP at home.  Currently taking losartan 50mg daily as directed. Also taking baby aspirin.  Denies lightheadedness, vision changes, headache, palpitations or leg swelling.    Pure hypercholesterolemia  Reviewed patient's recent lab results.  Her LDL has increased.  She would like to avoid starting a medication if possible.    Gastroesophageal reflux disease without esophagitis  Taking medication daily. No early satiety, unintentional weight loss, choking, persistent burning pain in chest or upper abdomen.     Right foot pain  This is a new problem to me.  Patient has had pain in her right foot, on the top, near her 2nd-5th toes, for several years.  However, due to change in her job which requires her to walk more often, the pain has worsened.  She has done some research online and is worried it may be a \"plantar plate fracture.\"  She has seen a foot doctor in the past and had surgery on bunions.  She would like a referral.    Current medicines (including changes today)  Current Outpatient Prescriptions   Medication Sig Dispense Refill   • esomeprazole (NEXIUM) 40 MG delayed-release capsule take 1 capsule by mouth every morning BEFORE BREAKFAST 30 Cap 11   • losartan (COZAAR) 50 MG Tab Take 1 Tab by mouth every day. 90 Tab 3   • fluticasone (FLONASE) 50 MCG/ACT nasal spray Spray 1 Spray in nose 2 times a day as needed.     • azelastine (ASTELIN) 137 MCG/SPRAY nasal spray [place] 1 Spray in nose 2 times a day.       No current facility-administered medications for this visit.      She  has a past medical history of Allergy; GERD (gastroesophageal reflux disease); and Uterine fibroid. She also has no past medical history of Encounter for long-term (current) use of other medications.    ROS   No chest " "pain, no shortness of breath, no abdominal pain.     Objective:     Physical Exam:  Blood pressure 100/62, pulse 74, temperature 37.3 °C (99.1 °F), temperature source Temporal, resp. rate 16, height 1.683 m (5' 6.25\"), weight 97.5 kg (215 lb), SpO2 96 %, not currently breastfeeding. Body mass index is 34.44 kg/m².   Constitutional: Alert, no distress, obese.  Skin: Warm, dry, good turgor, no rashes in visible areas.  Eye: Equal, round and reactive, conjunctiva clear, lids normal.  ENMT: Lips without lesions, good dentition, oropharynx clear.  Neck: Trachea midline, no masses, no thyromegaly. No cervical or supraclavicular lymphadenopathy.  Respiratory: Unlabored respiratory effort, lungs clear to auscultation, no wheezes, no rhonchi.  Cardiovascular: Normal S1, S2, no murmur, no edema.  Abdomen: Soft, non-tender, no masses, no hepatosplenomegaly.  MSK: Normal gait, moves all extremities.  Feet: No gross deformities.  Patient's 2nd and 3rd toes are angled outwards from each other.  Tender to palpation along dorsal aspect of right foot just proximal to 3rd-4th phalanges.  Psych: Alert and oriented x3, normal affect and mood.    Assessment and Plan:     1. Essential hypertension  Blood pressure well-controlled on current regimen.  Discussed decreasing losartan if blood pressure remains <120/80.    2. Pure hypercholesterolemia  Calculated ASCVD risk and it is low.  Continue to manage with diet and exercise.  Recheck annually.    3. Gastroesophageal reflux disease without esophagitis  This is a chronic and stable problem.  Patient is doing well.  No red flags.  Continue PPI and monitor.    4. Right foot pain  5. History of foot surgery  This is a new issue.  Unclear etiology and unclear if it is related to plantar plate fracture.  Referred to orthopedics.  - REFERRAL TO ORTHOPEDICS    6. Preventative health care  Annual labwork ordered for next year.  - CBC WITH DIFFERENTIAL; Future  - COMP METABOLIC PANEL; Future  - " LIPID PROFILE; Future  - TSH; Future  - VITAMIN D,25 HYDROXY; Future  - IRON; Future    Followup: Return in about 1 year (around 11/2/2019) for Annual exam and f/u HTN, short.         PLEASE NOTE: This dictation was created using voice recognition software. I have made every reasonable attempt to correct obvious errors, but I expect that there are errors of grammar and possibly content that I did not discover before finalizing the note.

## 2018-11-03 NOTE — ASSESSMENT & PLAN NOTE
Reviewed patient's recent lab results.  Her LDL has increased.  She would like to avoid starting a medication if possible.

## 2019-04-07 DIAGNOSIS — I10 ESSENTIAL HYPERTENSION: ICD-10-CM

## 2019-04-08 RX ORDER — LOSARTAN POTASSIUM 50 MG/1
TABLET ORAL
Qty: 90 TAB | Refills: 3 | Status: SHIPPED | OUTPATIENT
Start: 2019-04-08 | End: 2020-04-17

## 2019-07-09 ENCOUNTER — TELEPHONE (OUTPATIENT)
Dept: MEDICAL GROUP | Facility: PHYSICIAN GROUP | Age: 54
End: 2019-07-09

## 2019-07-09 NOTE — TELEPHONE ENCOUNTER
"Phone Number Called: [phone] (home)     Call outcome: Left Pt msg to schedule an appt with provider.    Message: Pt left msg with concerns of having elevated blood pressure ranging in the \"165s-170s\". Pt stated she has been \"doubling up\" on her losartan and wanted to know if she can get a refill for a different dosage.      just an FYI as well.  "

## 2019-09-20 ENCOUNTER — OFFICE VISIT (OUTPATIENT)
Dept: MEDICAL GROUP | Facility: PHYSICIAN GROUP | Age: 54
End: 2019-09-20
Payer: COMMERCIAL

## 2019-09-20 VITALS
OXYGEN SATURATION: 98 % | BODY MASS INDEX: 37.12 KG/M2 | DIASTOLIC BLOOD PRESSURE: 88 MMHG | HEIGHT: 66 IN | HEART RATE: 75 BPM | RESPIRATION RATE: 18 BRPM | TEMPERATURE: 98 F | SYSTOLIC BLOOD PRESSURE: 124 MMHG | WEIGHT: 231 LBS

## 2019-09-20 DIAGNOSIS — R22.1 LOCALIZED SWELLING, MASS OR LUMP OF NECK: ICD-10-CM

## 2019-09-20 DIAGNOSIS — M77.9 BONE SPUR: ICD-10-CM

## 2019-09-20 DIAGNOSIS — I10 ESSENTIAL HYPERTENSION: ICD-10-CM

## 2019-09-20 DIAGNOSIS — Z13.1 SCREENING FOR DIABETES MELLITUS: ICD-10-CM

## 2019-09-20 DIAGNOSIS — M79.671 BILATERAL FOOT PAIN: ICD-10-CM

## 2019-09-20 DIAGNOSIS — Z13.220 SCREENING FOR LIPID DISORDERS: ICD-10-CM

## 2019-09-20 DIAGNOSIS — L98.9 SKIN LESION OF FACE: ICD-10-CM

## 2019-09-20 DIAGNOSIS — E66.9 OBESITY (BMI 30-39.9): ICD-10-CM

## 2019-09-20 DIAGNOSIS — M79.672 BILATERAL FOOT PAIN: ICD-10-CM

## 2019-09-20 DIAGNOSIS — N95.1 HOT FLASHES DUE TO MENOPAUSE: ICD-10-CM

## 2019-09-20 PROCEDURE — 99214 OFFICE O/P EST MOD 30 MIN: CPT | Performed by: FAMILY MEDICINE

## 2019-09-20 RX ORDER — VENLAFAXINE HYDROCHLORIDE 37.5 MG/1
37.5 CAPSULE, EXTENDED RELEASE ORAL DAILY
Qty: 30 CAP | Refills: 1 | Status: SHIPPED | OUTPATIENT
Start: 2019-09-20 | End: 2019-11-14 | Stop reason: SDUPTHER

## 2019-09-20 ASSESSMENT — PATIENT HEALTH QUESTIONNAIRE - PHQ9: CLINICAL INTERPRETATION OF PHQ2 SCORE: 0

## 2019-09-21 NOTE — PROGRESS NOTES
"Subjective:   Ana Sherwood is a 54 y.o. female here today for multiple issues including follow-up blood pressure, cyst on face, hot flashes and foot pain.  She is unaccompanied for today's appointment.    Patient was seen with medical student, Chelsey Banks, MS4.    In regards to her hypertension, this is stable. Monitoring BP at home.  Currently taking losartan 50mg daily as directed.  Also taking baby aspirin. Denies lightheadedness, vision changes, headache, palpitations or leg swelling.    She has been having pain in her bilateral feet and ankles for several months.  She has had x-rays done at a podiatrist's office and urgent care.  No fractures were found, but she was told she has \"bone spurs.\"  She has tried wearing different footwear and taking over-the-counter medications without improvement.  She is requesting a cortisone injection today.    Ana mentions that it has been approximately one year since her last period.  She has been having more severe hot flashes and it has gotten to the point where she can not tolerate them.  Wearing layers and keeping the room temperature down do not help.  We discussed trying a medication.    Lastly, starting a month ago, she noticed a cyst-like lesion on the left side of her chin.  This was associated with swelling under her chin.  No associated fevers, chills, nausea or abdominal pain.  She mentions that she has had these lesions in the past, also along her jawline and under her chin.    Current medicines (including changes today)  Current Outpatient Medications   Medication Sig Dispense Refill   • venlafaxine XR (EFFEXOR XR) 37.5 MG CAPSULE SR 24 HR Take 1 Cap by mouth every day. 30 Cap 1   • losartan (COZAAR) 50 MG Tab take 1 tablet by mouth once daily 90 Tab 3   • esomeprazole (NEXIUM) 40 MG delayed-release capsule take 1 capsule by mouth every morning BEFORE BREAKFAST 30 Cap 11   • azelastine (ASTELIN) 137 MCG/SPRAY nasal spray [place] 1 [place] in nose 2 " "times a day.     • fluticasone (FLONASE) 50 MCG/ACT nasal spray Spray 1 Spray in nose 2 times a day as needed.       No current facility-administered medications for this visit.      She  has a past medical history of Allergy, GERD (gastroesophageal reflux disease), and Uterine fibroid. She also has no past medical history of Encounter for long-term (current) use of other medications.    ROS   See HPI. No chest pain, no shortness of breath, no abdominal pain.     Objective:     Physical Exam:  /88   Pulse 75   Temp 36.7 °C (98 °F)   Resp 18   Ht 1.683 m (5' 6.25\")   Wt 104.8 kg (231 lb)   SpO2 98%  Body mass index is 37 kg/m².   Constitutional: Alert, no distress, non-toxic appearance.  Skin: Along left chin there is a 1.5 x 1.5 x 0.3 cm hyperkeratotic papule. No discharge or surrounding erythema. Warm, dry, good turgor.  Eye: Equal, round and reactive, conjunctiva clear, lids normal.  ENMT: Lips without lesions, good dentition, moist mucous membranes.  Neck: Trachea midline, there is a palpable fullness on left side of the neck, possible lymphadenopathy, no thyromegaly.  Respiratory: Unlabored respiratory effort, no cough.  Abdomen: Soft, no gross masses.  MSK: Normal gait, moves all extremities. Bilateral heels are tender to palpation.  Neuro: Grossly non-focal. No cranial nerve deficit. Strength and sensation intact.   Psych: Alert and oriented x3, normal affect and mood.    Assessment and Plan:     1. Essential hypertension  Well-controlled on current regimen.  Labs as indicated.  Continue antihypertensive medications.  Discussed decreasing salt intake.  Emphasized benefits of exercise and diet. Continue to monitor.  - CBC WITH DIFFERENTIAL; Future  - Comp Metabolic Panel; Future  - Lipid Profile; Future    2. Bilateral foot pain  3. Bone spur  These are new issues to me today.  She is concerned for Rheumatoid Arthritis.  Labs ordered.  Referral to podiatry placed.   - REFERRAL TO PODIATRY  - Lipid " Profile; Future  - WESTERGREN SED RATE; Future  - CRP QUANTITIVE (NON-CARDIAC); Future  - RHEUMATOID ARTHRITIS FACTOR; Future  - CCP ANTIBODY; Future    4. Hot flashes due to menopause  This is a new issue that is worsening.  Trial of venlafaxine.  - venlafaxine XR (EFFEXOR XR) 37.5 MG CAPSULE SR 24 HR; Take 1 Cap by mouth every day.  Dispense: 30 Cap; Refill: 1    5. Skin lesion of face  6. Localized swelling, mass or lump of neck  These are also new issues today.  Concern for lymphadenopathy, abscess or other fluid collection.  Ultrasound ordered.  Patient will use topical antibiotic.  - US-SOFT TISSUES OF HEAD - NECK; Future    7. Screening for diabetes mellitus  8. Screening for lipid disorders  Labs ordered.  - Lipid Profile; Future    9. Obesity (BMI 30-39.9)  Patient's weight was discussed today, including healthy low-carb diet, 30-minutes of moderate exercise daily and avoiding sugars and high-fat foods.    - Patient identified as having weight management issue.  Appropriate orders and counseling given.    Followup: Return in about 2 months (around 11/20/2019).         PLEASE NOTE: This dictation was created using voice recognition software. I have made every reasonable attempt to correct obvious errors, but I expect that there are errors of grammar and possibly content that I did not discover before finalizing the note.

## 2019-11-14 DIAGNOSIS — N95.1 HOT FLASHES DUE TO MENOPAUSE: ICD-10-CM

## 2019-11-14 RX ORDER — VENLAFAXINE HYDROCHLORIDE 37.5 MG/1
CAPSULE, EXTENDED RELEASE ORAL
Qty: 90 CAP | Refills: 1 | Status: SHIPPED | OUTPATIENT
Start: 2019-11-14 | End: 2021-12-01

## 2019-12-06 ENCOUNTER — APPOINTMENT (OUTPATIENT)
Dept: MEDICAL GROUP | Facility: PHYSICIAN GROUP | Age: 54
End: 2019-12-06
Payer: COMMERCIAL

## 2020-04-16 DIAGNOSIS — I10 ESSENTIAL HYPERTENSION: ICD-10-CM

## 2020-04-17 RX ORDER — LOSARTAN POTASSIUM 50 MG/1
TABLET ORAL
Qty: 90 TAB | Refills: 0 | Status: SHIPPED | OUTPATIENT
Start: 2020-04-17 | End: 2020-08-04

## 2020-05-11 ENCOUNTER — TELEPHONE (OUTPATIENT)
Dept: DERMATOLOGY | Facility: IMAGING CENTER | Age: 55
End: 2020-05-11

## 2020-05-12 ENCOUNTER — TELEPHONE (OUTPATIENT)
Dept: DERMATOLOGY | Facility: IMAGING CENTER | Age: 55
End: 2020-05-12

## 2020-05-12 NOTE — TELEPHONE ENCOUNTER
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  Medications reviewed  Allergies reviewed    pharmacy verified   Patient notified to up load pictures

## 2020-05-13 ENCOUNTER — TELEMEDICINE (OUTPATIENT)
Dept: DERMATOLOGY | Facility: IMAGING CENTER | Age: 55
End: 2020-05-13
Payer: COMMERCIAL

## 2020-05-13 DIAGNOSIS — L30.0 NUMMULAR DERMATITIS: ICD-10-CM

## 2020-05-13 DIAGNOSIS — L72.9 CYST OF SKIN: ICD-10-CM

## 2020-05-13 PROCEDURE — 99203 OFFICE O/P NEW LOW 30 MIN: CPT | Mod: 95,CR | Performed by: DERMATOLOGY

## 2020-05-13 RX ORDER — MOMETASONE FUROATE 1 MG/G
OINTMENT TOPICAL
Qty: 60 G | Refills: 2 | Status: SHIPPED | OUTPATIENT
Start: 2020-05-13 | End: 2020-08-13 | Stop reason: SDUPTHER

## 2020-05-13 NOTE — PROGRESS NOTES
VIRTUAL VIDEO DERMATOLOGY VISIT     As a means of avoiding spread of COVID-19, this visit is being conducted by synchronous video with patient. This video visit was initiated by the patient and they verbally consented.    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 Patient interested in removal because it is not going away completely.    Last derm visit 2014  History of skin cancer: No  History of biopsies right cheek over 10 years benign   History of blistering/severe sunburns:Yes, Details: child   Family history of skin cancer:No  Family history of atypical moles:No    Tobacco use: 1 pack in a week , has been smoking for about 10 years   Alcohol use:DRINKS: occasionally in social situations  Allergies:Yes, Details: .  Medications reviewed  Allergies reviewed    pharmacy verified       Past Medical History:   Diagnosis Date   • Allergy    • GERD (gastroesophageal reflux disease)    • Uterine fibroid        Family History   Problem Relation Age of Onset   • Hyperlipidemia Mother    • Hypertension Father    • Lung Disease Father    • Cancer Maternal Grandmother         Breast and colon   • Cancer Maternal Grandfather         Prostate and colon   • Cancer Paternal Grandmother         Bone cancer   • Kidney Disease Paternal Grandfather         Social History     Socioeconomic History   •  Marital status: Single     Spouse name: Not on file   • Number of children: Not on file   • Years of education: Not on file   • Highest education level: Not on file   Occupational History   • Occupation: Litigation technician   Social Needs   • Financial resource strain: Not on file   • Food insecurity     Worry: Not on file     Inability: Not on file   • Transportation needs     Medical: Not on file     Non-medical: Not on file   Tobacco Use   • Smoking status: Current Some Day Smoker   • Smokeless tobacco: Never Used   Substance and Sexual Activity   • Alcohol use: Yes   • Drug use: No   • Sexual activity: Not Currently   Lifestyle   • Physical activity     Days per week: Not on file     Minutes per session: Not on file   • Stress: Not on file   Relationships   • Social connections     Talks on phone: Not on file     Gets together: Not on file     Attends Restoration service: Not on file     Active member of club or organization: Not on file     Attends meetings of clubs or organizations: Not on file     Relationship status: Not on file   • Intimate partner violence     Fear of current or ex partner: Not on file     Emotionally abused: Not on file     Physically abused: Not on file     Forced sexual activity: Not on file   Other Topics Concern   • Not on file   Social History Narrative   • Not on file        Allergies   Allergen Reactions   • Codeine Vomiting and Nausea   • Vicodin [Hydrocodone-Acetaminophen] Nausea     Small doses with food - patient tolerates    • Eggs         MEDICATIONS:  Medications relevant to specialty reviewed.    Current Outpatient Medications:   •  mometasone (ELOCON) 0.1 % Ointment, Apply twice daily to affected areas of the LEGS until the skin is smooth or up to 3 weeks, then stop 1 week. Repeat as needed., Disp: 60 g, Rfl: 2  •  losartan (COZAAR) 50 MG Tab, take 1 tablet by mouth once daily, Disp: 90 Tab, Rfl: 0  •  esomeprazole (NEXIUM) 40 MG delayed-release capsule, take 1 capsule by  mouth every morning BEFORE BREAKFAST, Disp: 30 Cap, Rfl: 11  •  venlafaxine XR (EFFEXOR XR) 37.5 MG CAPSULE SR 24 HR, take 1 capsule by mouth once daily, Disp: 90 Cap, Rfl: 1  •  fluticasone (FLONASE) 50 MCG/ACT nasal spray, Spray 1 Spray in nose 2 times a day as needed., Disp: , Rfl:   •  azelastine (ASTELIN) 137 MCG/SPRAY nasal spray, Spray 1 Spray in nose 2 times a day., Disp: , Rfl:      REVIEW OF SYSTEMS:   Positive for skin (see HPI)  Negative for fevers, chills and cough       EXAM:  There were no vitals taken for this visit.  Constitutional: Well-developed, well-nourished, and in no distress.   Neurological: Alert and oriented.    A focused skin exam limited by quality of video was performed including the affected areas of the head (including face), neck and left lower leg. Notable findings on exam today listed below.   Additionally, photos sent by patient via [employer] were reviewed.    - left chin with 0.5 - 1 cm (estimated) cystic structure   - left ankle and calf with few scattered round eczematous thin plaques        IMPRESSION / PLAN:    1. Nummular dermatitis  - We reviewed dry skin care in detail, including short showers or baths with luke warm water, limited use of fragrance-free soap, generous use of bland fragrance-free emollients within 3 min of exiting the shower or bath, and fragrance free laundry products.  - mometasone 0.1% ointment BID to affected areas of the body until the skin is smooth.  - Application of topical steroid should be followed immediately by a thick layer of vaseline, hydrolatum, or aquaphor.  - We reviewed risks/benefits/expectations of treatment in detail, including side effects of long term topical steroid use (such as striae, atrophy and telangiectasias).  - Call if not improving with above treatment    2. Favor Cyst of skin   -nature of the diagnosis was discussed in detail  -clinically benign today on exam, reassurance was given  -monitor at home and call with  changes  -hx of inflammation although not inflamed today, patient interested in cyst excision for complete removal  - plan to re-eval in person at follow up and then schedule cyst excision if patient interested    Return to clinic in: Return 3 months. and as needed for any new or changing skin lesions.    Gloria Khan M.D.

## 2020-05-13 NOTE — PATIENT INSTRUCTIONS
Daily Skin Care Instructions  » Bathe or shower daily or every-other-day in order to wash off dirt and other potential irritants (the optimal frequency of bathing is not yet clear).  » Water should be warm (not hot), and bath/shower time should be limited to 5-10 minutes.   » Limit soap use to face, armpits, groin and feet  » Use a fragrance-free gentle soap such as Dove sensitive skin, Cetaphil gentle skin cleanser, Cerave hydrating cleanser bar, or Vanicream cleansing bar/gentle body wash  » Pat-dry the skin and immediately apply a fragrance-free gentle moisturizer while the skin is still slightly damp. The moisturizer provides a seal to hold the water in the skin.   » The thicker the moisturizer, the better the barrier it generally provides. Ointments are more effective than creams, and creams more so than lotions. Creams are a reasonable option during the summer when thick greasy ointments are uncomfortable.  » Recommended moisturizers include Eucerin, Aquaphor, Cerave, Cetaphil, Aveeno, Vanicream    » It is also recommended to use fragrance-free laundry detergent and fabric softener such as All Free and Clear or Tide Free & Gentle   » When applying your prescription medication if indicated, apply the prescription prior to applying your moisturizer.

## 2020-08-04 DIAGNOSIS — I10 ESSENTIAL HYPERTENSION: ICD-10-CM

## 2020-08-04 RX ORDER — LOSARTAN POTASSIUM 50 MG/1
TABLET ORAL
Qty: 90 TAB | Refills: 1 | Status: SHIPPED | OUTPATIENT
Start: 2020-08-04 | End: 2021-02-01

## 2020-08-13 ENCOUNTER — OFFICE VISIT (OUTPATIENT)
Dept: DERMATOLOGY | Facility: IMAGING CENTER | Age: 55
End: 2020-08-13
Payer: COMMERCIAL

## 2020-08-13 DIAGNOSIS — L70.0 ACNE VULGARIS: ICD-10-CM

## 2020-08-13 DIAGNOSIS — L30.0 NUMMULAR DERMATITIS: ICD-10-CM

## 2020-08-13 PROCEDURE — 99213 OFFICE O/P EST LOW 20 MIN: CPT | Performed by: DERMATOLOGY

## 2020-08-13 RX ORDER — CLINDAMYCIN PHOSPHATE 10 UG/ML
LOTION TOPICAL
Qty: 60 ML | Refills: 3 | Status: SHIPPED | OUTPATIENT
Start: 2020-08-13 | End: 2022-07-21 | Stop reason: SDUPTHER

## 2020-08-13 RX ORDER — MOMETASONE FUROATE 1 MG/G
OINTMENT TOPICAL
Qty: 60 G | Refills: 2 | Status: SHIPPED | OUTPATIENT
Start: 2020-08-13 | End: 2022-07-21 | Stop reason: SDUPTHER

## 2020-08-13 NOTE — PROGRESS NOTES
RETURN DERMATOLOGY VISIT    Chief complaint:  Follow up management of nummular dermatitis on legs and cyst on chin    Dermatitis clears with mometasone after few days to a week, cyst has significantly improved, very small now. Notes history of cystic acne bumps near the chin since stopping birth control, was previously controlled with a topical antibiotic.      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 Patient interested in removal because it is not going away completely.    Last derm visit 2014  History of skin cancer: No  History of biopsies right cheek over 10 years benign   History of blistering/severe sunburns:Yes, Details: child   Family history of skin cancer:No  Family history of atypical moles:No    Tobacco use: 1 pack in a week , has been smoking for about 10 years   Alcohol use:DRINKS: occasionally in social situations  Allergies:Yes, Details: .  Medications reviewed  Allergies reviewed    pharmacy verified       Past Medical History:   Diagnosis Date   • Allergy    • GERD (gastroesophageal reflux disease)    • Uterine fibroid        Family History   Problem Relation Age of Onset   • Hyperlipidemia Mother    • Hypertension Father    • Lung Disease Father    • Cancer Maternal Grandmother         Breast and colon   • Cancer Maternal Grandfather         Prostate and colon   • Cancer  Paternal Grandmother         Bone cancer   • Kidney Disease Paternal Grandfather         Social History     Socioeconomic History   • Marital status: Single     Spouse name: Not on file   • Number of children: Not on file   • Years of education: Not on file   • Highest education level: Not on file   Occupational History   • Occupation: Litigation technician   Social Needs   • Financial resource strain: Not on file   • Food insecurity     Worry: Not on file     Inability: Not on file   • Transportation needs     Medical: Not on file     Non-medical: Not on file   Tobacco Use   • Smoking status: Current Some Day Smoker   • Smokeless tobacco: Never Used   Substance and Sexual Activity   • Alcohol use: Yes   • Drug use: No   • Sexual activity: Not Currently   Lifestyle   • Physical activity     Days per week: Not on file     Minutes per session: Not on file   • Stress: Not on file   Relationships   • Social connections     Talks on phone: Not on file     Gets together: Not on file     Attends Mandaeism service: Not on file     Active member of club or organization: Not on file     Attends meetings of clubs or organizations: Not on file     Relationship status: Not on file   • Intimate partner violence     Fear of current or ex partner: Not on file     Emotionally abused: Not on file     Physically abused: Not on file     Forced sexual activity: Not on file   Other Topics Concern   • Not on file   Social History Narrative   • Not on file        Allergies   Allergen Reactions   • Codeine Vomiting and Nausea   • Vicodin [Hydrocodone-Acetaminophen] Nausea     Small doses with food - patient tolerates    • Eggs         MEDICATIONS:  Medications relevant to specialty reviewed.    Current Outpatient Medications:   •  CLINDAMYCIN PHOSPHATE,TOPICAL, 1 % Lotion, Apply daily in the morning to the affected area of the face, Disp: 60 mL, Rfl: 3  •  mometasone (ELOCON) 0.1 % Ointment, Apply twice daily to affected areas of the LEGS  until the skin is smooth or up to 3 weeks, then stop 1 week. Repeat as needed., Disp: 60 g, Rfl: 2  •  losartan (COZAAR) 50 MG Tab, take 1 tablet by mouth once daily, Disp: 90 Tab, Rfl: 1  •  venlafaxine XR (EFFEXOR XR) 37.5 MG CAPSULE SR 24 HR, take 1 capsule by mouth once daily, Disp: 90 Cap, Rfl: 1  •  esomeprazole (NEXIUM) 40 MG delayed-release capsule, take 1 capsule by mouth every morning BEFORE BREAKFAST, Disp: 30 Cap, Rfl: 11  •  fluticasone (FLONASE) 50 MCG/ACT nasal spray, Spray 1 Spray in nose 2 times a day as needed., Disp: , Rfl:   •  azelastine (ASTELIN) 137 MCG/SPRAY nasal spray, Spray 1 Spray in nose 2 times a day., Disp: , Rfl:      REVIEW OF SYSTEMS:   Positive for skin (see HPI)  Negative for fevers, chills and cough       EXAM:    There were no vitals taken for this visit.  Constitutional: Well-developed, well-nourished, and in no distress.   HENT: Head normocephalic and atraumatic.   Neurological: Alert and oriented.    A focused skin exam was performed including the affected areas of the head (including face), neck, bilateral upper extremities and bilateral lower extremities. Notable findings on exam today listed below. Remaining above-listed examined areas within normal limits / negative for rashes or lesions.  - left chin with 0.3 cm cystic inflammatory papule  - jawline with few smaller inflammatory papules   - legs clear today     IMPRESSION / PLAN:    1. Nummular dermatitis - clear today  - We reviewed dry skin care in detail, including short showers or baths with luke warm water, limited use of fragrance-free soap, generous use of bland fragrance-free emollients within 3 min of exiting the shower or bath, and fragrance free laundry products.  - refilled mometasone 0.1% ointment BID to affected areas of the body until the skin is smooth. To use as needed for flares, pt to call if any individual lesion does not resolve with 3-4 weeks of treatment  - Application of topical steroid should be  followed immediately by a thick layer of vaseline, hydrolatum, or aquaphor.  - We reviewed risks/benefits/expectations of treatment in detail, including side effects of long term topical steroid use (such as striae, atrophy and telangiectasias).    2. Acne vulgaris/ cystic acne bump on left chin  - start clinda lotion daily  - patient would like to try chemical peels for acne as this has helped her in the past  - offered ILK to left chin lesion although risk of atrophy given lesion very small today and also could consider punch removal if lesion does not resolve with above (patient will call if she would like to schedule one of these)    Return to clinic in: Return if symptoms worsen or fail to improve. and as needed for any new or changing skin lesions.    Gloria Khan M.D.

## 2021-01-31 DIAGNOSIS — I10 ESSENTIAL HYPERTENSION: ICD-10-CM

## 2021-02-01 RX ORDER — LOSARTAN POTASSIUM 50 MG/1
TABLET ORAL
Qty: 90 TAB | Refills: 0 | Status: SHIPPED | OUTPATIENT
Start: 2021-02-01 | End: 2021-04-26 | Stop reason: SDUPTHER

## 2021-12-01 ENCOUNTER — HOSPITAL ENCOUNTER (OUTPATIENT)
Dept: LAB | Facility: MEDICAL CENTER | Age: 56
End: 2021-12-01
Attending: CLINICAL NURSE SPECIALIST
Payer: COMMERCIAL

## 2021-12-01 ENCOUNTER — OFFICE VISIT (OUTPATIENT)
Dept: MEDICAL GROUP | Facility: IMAGING CENTER | Age: 56
End: 2021-12-01
Payer: COMMERCIAL

## 2021-12-01 VITALS
TEMPERATURE: 97.3 F | SYSTOLIC BLOOD PRESSURE: 168 MMHG | RESPIRATION RATE: 12 BRPM | HEART RATE: 70 BPM | BODY MASS INDEX: 38.25 KG/M2 | DIASTOLIC BLOOD PRESSURE: 110 MMHG | OXYGEN SATURATION: 93 % | WEIGHT: 238 LBS | HEIGHT: 66 IN

## 2021-12-01 DIAGNOSIS — H92.09 EAR ACHE: ICD-10-CM

## 2021-12-01 DIAGNOSIS — E78.00 PURE HYPERCHOLESTEROLEMIA: ICD-10-CM

## 2021-12-01 DIAGNOSIS — Z76.89 ENCOUNTER TO ESTABLISH CARE WITH NEW DOCTOR: ICD-10-CM

## 2021-12-01 DIAGNOSIS — Z11.59 NEED FOR HEPATITIS C SCREENING TEST: ICD-10-CM

## 2021-12-01 DIAGNOSIS — E66.9 OBESITY (BMI 30-39.9): ICD-10-CM

## 2021-12-01 DIAGNOSIS — Z23 NEED FOR VACCINATION: ICD-10-CM

## 2021-12-01 DIAGNOSIS — I10 ESSENTIAL HYPERTENSION: ICD-10-CM

## 2021-12-01 DIAGNOSIS — Z78.0 MENOPAUSE: ICD-10-CM

## 2021-12-01 LAB
ALBUMIN SERPL BCP-MCNC: 4.7 G/DL (ref 3.2–4.9)
ALBUMIN/GLOB SERPL: 1.5 G/DL
ALP SERPL-CCNC: 91 U/L (ref 30–99)
ALT SERPL-CCNC: 56 U/L (ref 2–50)
ANION GAP SERPL CALC-SCNC: 10 MMOL/L (ref 7–16)
AST SERPL-CCNC: 64 U/L (ref 12–45)
BASOPHILS # BLD AUTO: 1.1 % (ref 0–1.8)
BASOPHILS # BLD: 0.08 K/UL (ref 0–0.12)
BILIRUB SERPL-MCNC: 0.5 MG/DL (ref 0.1–1.5)
BUN SERPL-MCNC: 15 MG/DL (ref 8–22)
CALCIUM SERPL-MCNC: 9.9 MG/DL (ref 8.5–10.5)
CHLORIDE SERPL-SCNC: 100 MMOL/L (ref 96–112)
CHOLEST SERPL-MCNC: 320 MG/DL (ref 100–199)
CO2 SERPL-SCNC: 25 MMOL/L (ref 20–33)
CREAT SERPL-MCNC: 0.71 MG/DL (ref 0.5–1.4)
EOSINOPHIL # BLD AUTO: 0.22 K/UL (ref 0–0.51)
EOSINOPHIL NFR BLD: 3.1 % (ref 0–6.9)
ERYTHROCYTE [DISTWIDTH] IN BLOOD BY AUTOMATED COUNT: 48.1 FL (ref 35.9–50)
EST. AVERAGE GLUCOSE BLD GHB EST-MCNC: 120 MG/DL
FASTING STATUS PATIENT QL REPORTED: NORMAL
GLOBULIN SER CALC-MCNC: 3.1 G/DL (ref 1.9–3.5)
GLUCOSE SERPL-MCNC: 104 MG/DL (ref 65–99)
HBA1C MFR BLD: 5.8 % (ref 4–5.6)
HCT VFR BLD AUTO: 43 % (ref 37–47)
HCV AB SER QL: NORMAL
HDLC SERPL-MCNC: 51 MG/DL
HGB BLD-MCNC: 15.1 G/DL (ref 12–16)
IMM GRANULOCYTES # BLD AUTO: 0.05 K/UL (ref 0–0.11)
IMM GRANULOCYTES NFR BLD AUTO: 0.7 % (ref 0–0.9)
LDLC SERPL CALC-MCNC: ABNORMAL MG/DL
LYMPHOCYTES # BLD AUTO: 2.42 K/UL (ref 1–4.8)
LYMPHOCYTES NFR BLD: 34.4 % (ref 22–41)
MCH RBC QN AUTO: 35.4 PG (ref 27–33)
MCHC RBC AUTO-ENTMCNC: 35.1 G/DL (ref 33.6–35)
MCV RBC AUTO: 100.7 FL (ref 81.4–97.8)
MONOCYTES # BLD AUTO: 0.5 K/UL (ref 0–0.85)
MONOCYTES NFR BLD AUTO: 7.1 % (ref 0–13.4)
NEUTROPHILS # BLD AUTO: 3.76 K/UL (ref 2–7.15)
NEUTROPHILS NFR BLD: 53.6 % (ref 44–72)
NRBC # BLD AUTO: 0 K/UL
NRBC BLD-RTO: 0 /100 WBC
PLATELET # BLD AUTO: 231 K/UL (ref 164–446)
PMV BLD AUTO: 11.9 FL (ref 9–12.9)
POTASSIUM SERPL-SCNC: 4.2 MMOL/L (ref 3.6–5.5)
PROT SERPL-MCNC: 7.8 G/DL (ref 6–8.2)
RBC # BLD AUTO: 4.27 M/UL (ref 4.2–5.4)
SODIUM SERPL-SCNC: 135 MMOL/L (ref 135–145)
TRIGL SERPL-MCNC: 431 MG/DL (ref 0–149)
TSH SERPL DL<=0.005 MIU/L-ACNC: 2.35 UIU/ML (ref 0.38–5.33)
WBC # BLD AUTO: 7 K/UL (ref 4.8–10.8)

## 2021-12-01 PROCEDURE — 82746 ASSAY OF FOLIC ACID SERUM: CPT

## 2021-12-01 PROCEDURE — 82977 ASSAY OF GGT: CPT

## 2021-12-01 PROCEDURE — 84443 ASSAY THYROID STIM HORMONE: CPT

## 2021-12-01 PROCEDURE — 80061 LIPID PANEL: CPT

## 2021-12-01 PROCEDURE — 82306 VITAMIN D 25 HYDROXY: CPT

## 2021-12-01 PROCEDURE — 90715 TDAP VACCINE 7 YRS/> IM: CPT | Performed by: CLINICAL NURSE SPECIALIST

## 2021-12-01 PROCEDURE — 83036 HEMOGLOBIN GLYCOSYLATED A1C: CPT

## 2021-12-01 PROCEDURE — 85025 COMPLETE CBC W/AUTO DIFF WBC: CPT

## 2021-12-01 PROCEDURE — 83880 ASSAY OF NATRIURETIC PEPTIDE: CPT

## 2021-12-01 PROCEDURE — 99214 OFFICE O/P EST MOD 30 MIN: CPT | Mod: 25 | Performed by: CLINICAL NURSE SPECIALIST

## 2021-12-01 PROCEDURE — 90471 IMMUNIZATION ADMIN: CPT | Performed by: CLINICAL NURSE SPECIALIST

## 2021-12-01 PROCEDURE — 36415 COLL VENOUS BLD VENIPUNCTURE: CPT

## 2021-12-01 PROCEDURE — 86803 HEPATITIS C AB TEST: CPT

## 2021-12-01 PROCEDURE — 80053 COMPREHEN METABOLIC PANEL: CPT

## 2021-12-01 RX ORDER — DOXYCYCLINE HYCLATE 100 MG/1
CAPSULE ORAL
COMMUNITY
Start: 2021-10-29 | End: 2021-12-01

## 2021-12-01 ASSESSMENT — PAIN SCALES - GENERAL: PAINLEVEL: 3=SLIGHT PAIN

## 2021-12-01 ASSESSMENT — PATIENT HEALTH QUESTIONNAIRE - PHQ9: CLINICAL INTERPRETATION OF PHQ2 SCORE: 0

## 2021-12-01 NOTE — ASSESSMENT & PLAN NOTE
Ana has had multiple recent ear infections in her left ear. She has seen ENT for this issue.  She has also had an abscessed tonsil and had tonsillectomy.  She was given doxycycline for 10 days and ear still hurt but resolved for one week and returned today.  Today ear aches with sharp pain.  No fevers or chills.

## 2021-12-01 NOTE — ASSESSMENT & PLAN NOTE
Exercises with an eliptical and rowing machine, worked out for one hour a day, 5 days a week for 4 months and did not lose weight.  Diet: grapefruit, avocado, yogurt, cottage cheese, chicken, vegetables cruciferous, cookies, alcohol, rarely fried food, soda. Drinks green and black tea.  No hair thinning or eyebrow thinning.  No fatigue or constipation.

## 2021-12-01 NOTE — PROGRESS NOTES
Subjective     Ana Sherwood is a 56 y.o. female who presents with Hypertension, Hyperlipidemia (hx of being on medication), and Otalgia (left ear, chronic ear infections)            HPI  Essential hypertension  Ana ran out of her BP meds and went to Urgent Care for a refill. She started Losartan 50mg.  BP at home 150/100.  Smokes cigarettes intermittently. No chest pain, ,dizziness, diaphoresis or headaches. She is hot all the time from menopause.     Ear ache  Ana has had multiple recent ear infections in her left ear. She has seen ENT for this issue.  She has also had an abscessed tonsil and had tonsillectomy.  She was given doxycycline for 10 days and ear still hurt but resolved for one week and returned today.  Today ear aches with sharp pain.  No fevers or chills.       Obesity (BMI 30-39.9)  Exercises with an eliptical and rowing machine, worked out for one hour a day, 5 days a week for 4 months and did not lose weight.  Diet: grapefruit, avocado, yogurt, cottage cheese, chicken, vegetables cruciferous, cookies, alcohol, rarely fried food, soda. Drinks green and black tea.  No hair thinning or eyebrow thinning.  No fatigue or constipation.      ROS  See HPI      Allergies   Allergen Reactions   • Codeine Vomiting and Nausea   • Vicodin [Hydrocodone-Acetaminophen] Nausea     Small doses with food - patient tolerates    • Eggs      Current Outpatient Medications on File Prior to Visit   Medication Sig Dispense Refill   • losartan (COZAAR) 50 MG Tab Take 1 tablet by mouth every day. 90 tablet 0   • CLINDAMYCIN PHOSPHATE,TOPICAL, 1 % Lotion Apply daily in the morning to the affected area of the face 60 mL 3   • mometasone (ELOCON) 0.1 % Ointment Apply twice daily to affected areas of the LEGS until the skin is smooth or up to 3 weeks, then stop 1 week. Repeat as needed. 60 g 2   • esomeprazole (NEXIUM) 40 MG delayed-release capsule take 1 capsule by mouth every morning BEFORE BREAKFAST (Patient  "taking differently: 75 mg.) 30 Cap 11     No current facility-administered medications on file prior to visit.           Objective     BP (!) 168/110   Pulse 70   Temp 36.3 °C (97.3 °F)   Resp 12   Ht 1.683 m (5' 6.25\")   Wt 108 kg (238 lb)   LMP 12/20/2017   SpO2 93%   BMI 38.12 kg/m²      Physical Exam  Constitutional:       General: She is not in acute distress.     Appearance: Normal appearance. She is not ill-appearing, toxic-appearing or diaphoretic.   HENT:      Head: Normocephalic and atraumatic.      Right Ear: Ear canal and external ear normal.      Left Ear: Ear canal and external ear normal.      Ears:      Comments: Left TM bulging, no erythema.  Right TM mildly bulging, without erythema  Eyes:      General: No scleral icterus.     Pupils: Pupils are equal, round, and reactive to light.   Cardiovascular:      Rate and Rhythm: Normal rate and regular rhythm.      Heart sounds: Normal heart sounds.   Pulmonary:      Effort: Pulmonary effort is normal.      Breath sounds: Normal breath sounds. No wheezing.   Musculoskeletal:      Cervical back: No rigidity.   Lymphadenopathy:      Cervical: No cervical adenopathy.   Skin:     General: Skin is warm and dry.   Neurological:      Mental Status: She is alert and oriented to person, place, and time.      Gait: Gait normal.   Psychiatric:         Mood and Affect: Mood normal.         Behavior: Behavior normal.         Thought Content: Thought content normal.         Judgment: Judgment normal.                    Assessment & Plan        1. Encounter to establish care with new doctor  Ana is in clinic to establish care with me today. She has multiple concerns.  See below.    2. Obesity (BMI 30-39.9)  Ana has chronic obesity. She is concerned about an inability to lose weight.  She is to resume exercise and improve eating habits, cutting out fried foods completely and reducing sugar.  Labs ordered below.      - CBC WITH DIFFERENTIAL; Future  - Comp " Metabolic Panel; Future  - HEMOGLOBIN A1C; Future  - Lipid Profile; Future  - TSH WITH REFLEX TO FT4; Future  - VITAMIN D,25 HYDROXY; Future    3. Pure hypercholesterolemia  Labs ordered. She is not currently taking a statin. Will review labs to determine appropriate medication at next visit.     - Lipid Profile; Future    4. Need for hepatitis C screening test    - HEP C VIRUS ANTIBODY; Future    5. Essential hypertension  Ana has chronic hypertension. She was off BP meds for a while but recently restarted losartan 50mg.  Her BP remains elevated with a diastolic above 100 today. She says this is how her BP has been at home as well. She is asymptomatic. She is to double her losartan to 100mg daily.  If in 3 days, her BP is not down sufficiently, she is to call the clinic for next steps.  We discussed the risk of persistent elevated BP.  If she has concerning symptoms, she should go to the ER.      6. Ear ache  Ana has a recurring left ear infection. Today her TM is bulging but not erythematous on the left. Right TM bulging slightly.  She was instructed to call ENT for further evaluation as this issue is recurring.    7. Menopause  Ana complains of hot flashes that started with menopause. She was prescribed Effexor but had adverse side effects and stopped. She is to increase her intake of yams and walnuts and try acupuncture for management of this issue.   - Referral for Acupuncture    8. Need for vaccination  Ana is due for multiple vaccines. She will receive TDap today and   - Tdap Vaccine =>8YO IM    Return in 1 week (on 12/8/2021), or if symptoms worsen or fail to improve, for With test results.

## 2021-12-01 NOTE — ASSESSMENT & PLAN NOTE
Ana ran out of her BP meds and went to Urgent Care for a refill. She started Losartan 50mg.  BP at home 150/100.  Smokes cigarettes intermittently. No chest pain, ,dizziness, diaphoresis or headaches. She is hot all the time from menopause.

## 2021-12-02 DIAGNOSIS — R79.89 ELEVATED LFTS: ICD-10-CM

## 2021-12-02 DIAGNOSIS — I10 ESSENTIAL HYPERTENSION: ICD-10-CM

## 2021-12-02 DIAGNOSIS — R71.8 ELEVATED MCV: ICD-10-CM

## 2021-12-02 LAB
FOLATE SERPL-MCNC: 9.5 NG/ML
GGT SERPL-CCNC: 228 U/L (ref 7–34)
NT-PROBNP SERPL IA-MCNC: 24 PG/ML (ref 0–125)

## 2021-12-03 DIAGNOSIS — R79.89 ELEVATED LFTS: ICD-10-CM

## 2021-12-03 LAB — 25(OH)D3 SERPL-MCNC: 37 NG/ML (ref 30–80)

## 2021-12-03 RX ORDER — LOSARTAN POTASSIUM 100 MG/1
100 TABLET ORAL DAILY
Qty: 90 TABLET | Refills: 2 | Status: SHIPPED | OUTPATIENT
Start: 2021-12-03 | End: 2022-08-04

## 2021-12-03 NOTE — TELEPHONE ENCOUNTER
Received message from patient. She states she saw Marlon Boone yesterday. She states they discussed increasing her Losartan to 100 mg. Patient states she did not get a new prescription sent to the pharmacy. Patient is requesting a new prescription be sent to [facility] in [place].

## 2021-12-10 ENCOUNTER — HOSPITAL ENCOUNTER (OUTPATIENT)
Dept: LAB | Facility: MEDICAL CENTER | Age: 56
End: 2021-12-10
Attending: CLINICAL NURSE SPECIALIST
Payer: COMMERCIAL

## 2021-12-10 ENCOUNTER — HOSPITAL ENCOUNTER (OUTPATIENT)
Dept: RADIOLOGY | Facility: MEDICAL CENTER | Age: 56
End: 2021-12-10
Attending: CLINICAL NURSE SPECIALIST
Payer: COMMERCIAL

## 2021-12-10 ENCOUNTER — OFFICE VISIT (OUTPATIENT)
Dept: MEDICAL GROUP | Facility: IMAGING CENTER | Age: 56
End: 2021-12-10
Payer: COMMERCIAL

## 2021-12-10 VITALS
HEART RATE: 79 BPM | OXYGEN SATURATION: 94 % | DIASTOLIC BLOOD PRESSURE: 82 MMHG | HEIGHT: 66 IN | SYSTOLIC BLOOD PRESSURE: 138 MMHG | TEMPERATURE: 98.2 F | WEIGHT: 236 LBS | BODY MASS INDEX: 37.93 KG/M2

## 2021-12-10 DIAGNOSIS — R74.8 ELEVATED LIVER ENZYMES: ICD-10-CM

## 2021-12-10 DIAGNOSIS — E78.2 MIXED HYPERLIPIDEMIA: ICD-10-CM

## 2021-12-10 DIAGNOSIS — R73.03 PREDIABETES: ICD-10-CM

## 2021-12-10 DIAGNOSIS — G89.29 CHRONIC PAIN OF RIGHT KNEE: ICD-10-CM

## 2021-12-10 DIAGNOSIS — R79.89 ELEVATED LFTS: ICD-10-CM

## 2021-12-10 DIAGNOSIS — R71.8 ELEVATED MCV: ICD-10-CM

## 2021-12-10 DIAGNOSIS — Z12.31 ENCOUNTER FOR SCREENING MAMMOGRAM FOR BREAST CANCER: ICD-10-CM

## 2021-12-10 DIAGNOSIS — Z23 NEED FOR VACCINATION: ICD-10-CM

## 2021-12-10 DIAGNOSIS — I10 ESSENTIAL HYPERTENSION: ICD-10-CM

## 2021-12-10 DIAGNOSIS — M25.561 CHRONIC PAIN OF RIGHT KNEE: ICD-10-CM

## 2021-12-10 DIAGNOSIS — K21.9 GASTROESOPHAGEAL REFLUX DISEASE WITHOUT ESOPHAGITIS: ICD-10-CM

## 2021-12-10 LAB
FERRITIN SERPL-MCNC: 331 NG/ML (ref 10–291)
HBV CORE AB SERPL QL IA: NONREACTIVE
HBV CORE IGM SER QL: NORMAL
HBV SURFACE AG SER QL: NORMAL
HGB RETIC QN AUTO: 38.9 PG/CELL (ref 29–35)
IMM RETICS NFR: 17.7 % (ref 9.3–17.4)
RETICS # AUTO: 0.11 M/UL (ref 0.04–0.06)
RETICS/RBC NFR: 2.5 % (ref 0.8–2.1)

## 2021-12-10 PROCEDURE — 87340 HEPATITIS B SURFACE AG IA: CPT

## 2021-12-10 PROCEDURE — 90750 HZV VACC RECOMBINANT IM: CPT | Performed by: CLINICAL NURSE SPECIALIST

## 2021-12-10 PROCEDURE — 76705 ECHO EXAM OF ABDOMEN: CPT

## 2021-12-10 PROCEDURE — 99214 OFFICE O/P EST MOD 30 MIN: CPT | Mod: 25 | Performed by: CLINICAL NURSE SPECIALIST

## 2021-12-10 PROCEDURE — 83540 ASSAY OF IRON: CPT

## 2021-12-10 PROCEDURE — 86704 HEP B CORE ANTIBODY TOTAL: CPT

## 2021-12-10 PROCEDURE — 36415 COLL VENOUS BLD VENIPUNCTURE: CPT

## 2021-12-10 PROCEDURE — 85046 RETICYTE/HGB CONCENTRATE: CPT

## 2021-12-10 PROCEDURE — 83615 LACTATE (LD) (LDH) ENZYME: CPT

## 2021-12-10 PROCEDURE — 82390 ASSAY OF CERULOPLASMIN: CPT

## 2021-12-10 PROCEDURE — 82525 ASSAY OF COPPER: CPT

## 2021-12-10 PROCEDURE — 86709 HEPATITIS A IGM ANTIBODY: CPT

## 2021-12-10 PROCEDURE — 82728 ASSAY OF FERRITIN: CPT

## 2021-12-10 PROCEDURE — 86708 HEPATITIS A ANTIBODY: CPT

## 2021-12-10 PROCEDURE — 86705 HEP B CORE ANTIBODY IGM: CPT

## 2021-12-10 PROCEDURE — 90471 IMMUNIZATION ADMIN: CPT | Performed by: CLINICAL NURSE SPECIALIST

## 2021-12-10 PROCEDURE — 83550 IRON BINDING TEST: CPT

## 2021-12-10 RX ORDER — ROSUVASTATIN CALCIUM 20 MG/1
20 TABLET, COATED ORAL EVERY EVENING
Qty: 30 TABLET | Refills: 2 | Status: SHIPPED | OUTPATIENT
Start: 2021-12-10 | End: 2022-01-20

## 2021-12-10 ASSESSMENT — ENCOUNTER SYMPTOMS
DIAPHORESIS: 0
NAUSEA: 0
COUGH: 0
SHORTNESS OF BREATH: 0
CONSTIPATION: 0
WEIGHT LOSS: 0
HEARTBURN: 1
DIARRHEA: 0
ABDOMINAL PAIN: 0
DIZZINESS: 0

## 2021-12-10 ASSESSMENT — FIBROSIS 4 INDEX: FIB4 SCORE: 2

## 2021-12-10 ASSESSMENT — PAIN SCALES - GENERAL: PAINLEVEL: NO PAIN

## 2021-12-10 NOTE — PROGRESS NOTES
"Subjective     Ana Sherwood is a 56 y.o. female who presents with Follow-Up (blood work) and Hypertension Follow-up (was high before and even higher w/new meds)            HPI  Essential hypertension  Ana started losartan one week ago.  She has been taking BP at home, has been 158 systolic and up to 108-113. Saturday was improved.      Elevated liver enzymes  She drinks wine approximately 1/2 a bottle or one bottle 1-2 times a week.  Occasionally drinks hard alcohol, every other week.  No melena, yellowing eyes.    Chronic pain of right knee  Chronic for many years but she twisted her knee wrong and now has worsened pain. Used ice which helped.        Review of Systems   Constitutional: Negative for diaphoresis and weight loss.   Respiratory: Negative for cough and shortness of breath.    Cardiovascular: Negative for chest pain.   Gastrointestinal: Positive for heartburn (takes Nexium 20mg prn, occasionally 40mg). Negative for abdominal pain, constipation, diarrhea, melena and nausea.   Neurological: Negative for dizziness.     The 10-year ASCVD risk score (Yesi DC Jr., et al., 2013) is: 12.4%           Objective     /82 (BP Location: Right arm, Patient Position: Sitting, BP Cuff Size: Adult)   Pulse 79   Temp 36.8 °C (98.2 °F) (Temporal)   Ht 1.676 m (5' 6\")   Wt 107 kg (236 lb)   LMP 12/20/2017   SpO2 94%   BMI 38.09 kg/m²      Physical Exam  Constitutional:       General: She is not in acute distress.     Appearance: Normal appearance. She is obese. She is not ill-appearing, toxic-appearing or diaphoretic.   HENT:      Head: Normocephalic and atraumatic.   Eyes:      General: No scleral icterus.     Pupils: Pupils are equal, round, and reactive to light.   Cardiovascular:      Rate and Rhythm: Normal rate and regular rhythm.      Heart sounds: Normal heart sounds.   Pulmonary:      Effort: Pulmonary effort is normal.      Breath sounds: Normal breath sounds.   Musculoskeletal:      Right " lower leg: No edema.      Left lower leg: No edema.   Lymphadenopathy:      Cervical: No cervical adenopathy.   Skin:     General: Skin is warm and dry.   Neurological:      Mental Status: She is alert and oriented to person, place, and time.      Gait: Gait normal.   Psychiatric:         Mood and Affect: Mood normal.         Behavior: Behavior normal.         Thought Content: Thought content normal.         Judgment: Judgment normal.                             Assessment & Plan       1. Essential hypertension  Continue losartan 100 mg for 1 more week.  Continue take BP readings at home.  If blood pressure continues to be markedly elevated, will initiate a second antihypertensive.    2. Need for vaccination    - Shingrix Vaccine    3. Encounter for screening mammogram for breast cancer    - MA-SCREENING MAMMO BILAT W/TOMOSYNTHESIS W/CAD; Future    4. Mixed hyperlipidemia  Ana has hyperlipidemia.  Her ASCVD risk score is 12.4%.  She is to continue to eat a heart healthy diet, avoid transfats and reduce or eliminate alcohol.  She will be started on rosuvastatin 20 mg.  She was given a referral to nutrition services for more dietary guidance.  We will repeat lipid profile in 3 months.  Order placed.  - rosuvastatin (CRESTOR) 20 MG Tab; Take 1 Tablet by mouth every evening.  Dispense: 30 Tablet; Refill: 2  - Referral to Nutrition Services  - Lipid Profile; Future    5. Elevated liver enzymes    Has elevated liver enzymes.  She is currently asymptomatic, no abdominal pain, jaundice, change in bowel habit.  AST 64, ALT 56 and GGT was elevated at 228.  Bilirubin normal.  Hep C negative.  Further work-up ordered, some labs ordered previously and some added today.  She is also scheduled to get a right upper quadrant ultrasound today.  - RETICULOCYTES COUNT; Future  - HEPATITIS A AB IGM+TOTAL; Future  - LDH; Future    6. Prediabetes  Ana has a glycohemoglobin of 5.8.  Her fasting glucose is 104.  We discussed dietary  guidelines to help reduce blood sugar including increasing vegetable intake and reducing intake of short-chain carbohydrates including sugars, white bread, white pasta.  She is also to reduce or eliminate alcohol.  She was encouraged to exercise more.  We will recheck these labs in 3 months.  Referral to nutrition services placed.    - Referral to Nutrition Services    7. Elevated MCV  Ana has an elevated MCV, MCH and MCHC.  B12 and folate were normal.  Labs for further work-up previously ordered and added to today.  - CERULOPLASMIN; Future    8. Gastroesophageal reflux disease without esophagitis  Ana has intermittent reflux.  She says she uses esomeprazole as needed, occasionally 20 mg occasionally 40 mg.  She does not use this daily.  We discussed a referral to gastroenterology for this continued issue.  We will continue to evaluate.    - esomeprazole (NEXIUM) 20 MG capsule; Take 1 Capsule by mouth every morning before breakfast. BEFORE BREAKFAST  Dispense: 60 Capsule; Refill: 1    9. Chronic pain of right knee  Ana has chronic right knee pain that she says feels like she has loose ligaments.  She recently twisted it awkwardly and is now experiencing increased pain.  Ice helped initially but the pain remains and it is inhibiting her activity level.  Her gait was normal today.  Referral to orthopedics given  - Referral to Orthopedics     Return in about 1 week (around 12/17/2021).

## 2021-12-10 NOTE — PATIENT INSTRUCTIONS

## 2021-12-10 NOTE — ASSESSMENT & PLAN NOTE
Chronic for many years but she twisted her knee wrong and now has worsened pain. Used ice which helped.

## 2021-12-10 NOTE — ASSESSMENT & PLAN NOTE
She drinks wine approximately 1/2 a bottle or one bottle 1-2 times a week.  Occasionally drinks hard alcohol, every other week.  No melena, yellowing eyes.

## 2021-12-10 NOTE — ASSESSMENT & PLAN NOTE
Ana started losartan one week ago.  She has been taking BP at home, has been 158 systolic and up to 108-113. Saturday was improved.

## 2021-12-11 LAB
IRON SATN MFR SERPL: 41 % (ref 15–55)
IRON SERPL-MCNC: 153 UG/DL (ref 40–170)
LDH SERPL L TO P-CCNC: 252 U/L (ref 107–266)
TIBC SERPL-MCNC: 377 UG/DL (ref 250–450)
UIBC SERPL-MCNC: 224 UG/DL (ref 110–370)

## 2021-12-12 LAB
HAV AB SER QL IA: POSITIVE
HAV IGM SERPL QL IA: NEGATIVE

## 2021-12-15 LAB
CERULOPLASMIN SERPL-MCNC: 28 MG/DL (ref 17–54)
COPPER SERPL-MCNC: 135 UG/DL (ref 80–155)

## 2021-12-17 DIAGNOSIS — K76.0 HEPATIC STEATOSIS: ICD-10-CM

## 2021-12-17 DIAGNOSIS — R89.9 ABNORMAL LABORATORY TEST: ICD-10-CM

## 2021-12-17 NOTE — PROGRESS NOTES
Hepatic ultrasound reveals hepatic steatosis and hepatomegaly,  Liver is 18.13cm, with possible fibrosis.  Reticulocytes elevated. Gamma GT elevated.  She is a confirmed alcohol user.  Ferritin likely elevated due to liver disease.  Ordered haptoglobin and UA. LDH normal. Referral to GI placed.

## 2022-01-20 ENCOUNTER — OFFICE VISIT (OUTPATIENT)
Dept: MEDICAL GROUP | Facility: IMAGING CENTER | Age: 57
End: 2022-01-20
Payer: COMMERCIAL

## 2022-01-20 ENCOUNTER — HOSPITAL ENCOUNTER (OUTPATIENT)
Dept: LAB | Facility: MEDICAL CENTER | Age: 57
End: 2022-01-20
Attending: CLINICAL NURSE SPECIALIST
Payer: COMMERCIAL

## 2022-01-20 VITALS
HEIGHT: 66 IN | OXYGEN SATURATION: 95 % | HEART RATE: 64 BPM | TEMPERATURE: 96.7 F | BODY MASS INDEX: 38.09 KG/M2 | DIASTOLIC BLOOD PRESSURE: 82 MMHG | WEIGHT: 237 LBS | RESPIRATION RATE: 16 BRPM | SYSTOLIC BLOOD PRESSURE: 140 MMHG

## 2022-01-20 DIAGNOSIS — R71.8 ELEVATED MCV: ICD-10-CM

## 2022-01-20 DIAGNOSIS — E78.00 PURE HYPERCHOLESTEROLEMIA: ICD-10-CM

## 2022-01-20 DIAGNOSIS — Z23 NEED FOR VACCINATION: ICD-10-CM

## 2022-01-20 DIAGNOSIS — I10 ESSENTIAL HYPERTENSION: ICD-10-CM

## 2022-01-20 DIAGNOSIS — R74.8 ELEVATED LIVER ENZYMES: ICD-10-CM

## 2022-01-20 DIAGNOSIS — R70.1 RETICULOCYTOSIS: ICD-10-CM

## 2022-01-20 DIAGNOSIS — R89.9 ABNORMAL LABORATORY TEST: ICD-10-CM

## 2022-01-20 DIAGNOSIS — G44.209 TENSION HEADACHE: ICD-10-CM

## 2022-01-20 LAB
ALBUMIN SERPL BCP-MCNC: 4.9 G/DL (ref 3.2–4.9)
ALBUMIN/GLOB SERPL: 1.5 G/DL
ALP SERPL-CCNC: 118 U/L (ref 30–99)
ALT SERPL-CCNC: 53 U/L (ref 2–50)
ANION GAP SERPL CALC-SCNC: 12 MMOL/L (ref 7–16)
APPEARANCE UR: CLEAR
AST SERPL-CCNC: 48 U/L (ref 12–45)
BASOPHILS # BLD AUTO: 1.1 % (ref 0–1.8)
BASOPHILS # BLD: 0.11 K/UL (ref 0–0.12)
BILIRUB SERPL-MCNC: 0.6 MG/DL (ref 0.1–1.5)
BILIRUB UR QL STRIP.AUTO: NEGATIVE
BUN SERPL-MCNC: 20 MG/DL (ref 8–22)
CALCIUM SERPL-MCNC: 10.1 MG/DL (ref 8.5–10.5)
CHLORIDE SERPL-SCNC: 101 MMOL/L (ref 96–112)
CO2 SERPL-SCNC: 24 MMOL/L (ref 20–33)
COLOR UR: YELLOW
CREAT SERPL-MCNC: 0.96 MG/DL (ref 0.5–1.4)
EOSINOPHIL # BLD AUTO: 0.27 K/UL (ref 0–0.51)
EOSINOPHIL NFR BLD: 2.8 % (ref 0–6.9)
ERYTHROCYTE [DISTWIDTH] IN BLOOD BY AUTOMATED COUNT: 45.6 FL (ref 35.9–50)
GGT SERPL-CCNC: 244 U/L (ref 7–34)
GLOBULIN SER CALC-MCNC: 3.2 G/DL (ref 1.9–3.5)
GLUCOSE SERPL-MCNC: 92 MG/DL (ref 65–99)
GLUCOSE UR STRIP.AUTO-MCNC: NEGATIVE MG/DL
HCT VFR BLD AUTO: 39.7 % (ref 37–47)
HGB BLD-MCNC: 14 G/DL (ref 12–16)
IMM GRANULOCYTES # BLD AUTO: 0.09 K/UL (ref 0–0.11)
IMM GRANULOCYTES NFR BLD AUTO: 0.9 % (ref 0–0.9)
KETONES UR STRIP.AUTO-MCNC: NEGATIVE MG/DL
LEUKOCYTE ESTERASE UR QL STRIP.AUTO: NEGATIVE
LYMPHOCYTES # BLD AUTO: 3.02 K/UL (ref 1–4.8)
LYMPHOCYTES NFR BLD: 31.1 % (ref 22–41)
MCH RBC QN AUTO: 34.7 PG (ref 27–33)
MCHC RBC AUTO-ENTMCNC: 35.3 G/DL (ref 33.6–35)
MCV RBC AUTO: 98.5 FL (ref 81.4–97.8)
MICRO URNS: NORMAL
MONOCYTES # BLD AUTO: 0.55 K/UL (ref 0–0.85)
MONOCYTES NFR BLD AUTO: 5.7 % (ref 0–13.4)
NEUTROPHILS # BLD AUTO: 5.67 K/UL (ref 2–7.15)
NEUTROPHILS NFR BLD: 58.4 % (ref 44–72)
NITRITE UR QL STRIP.AUTO: NEGATIVE
NRBC # BLD AUTO: 0 K/UL
NRBC BLD-RTO: 0 /100 WBC
PH UR STRIP.AUTO: 5.5 [PH] (ref 5–8)
PLATELET # BLD AUTO: 262 K/UL (ref 164–446)
PMV BLD AUTO: 11.6 FL (ref 9–12.9)
POTASSIUM SERPL-SCNC: 4.5 MMOL/L (ref 3.6–5.5)
PROT SERPL-MCNC: 8.1 G/DL (ref 6–8.2)
PROT UR QL STRIP: NEGATIVE MG/DL
RBC # BLD AUTO: 4.03 M/UL (ref 4.2–5.4)
RBC UR QL AUTO: NEGATIVE
SODIUM SERPL-SCNC: 137 MMOL/L (ref 135–145)
SP GR UR STRIP.AUTO: 1.01
UROBILINOGEN UR STRIP.AUTO-MCNC: 0.2 MG/DL
WBC # BLD AUTO: 9.7 K/UL (ref 4.8–10.8)

## 2022-01-20 PROCEDURE — 80053 COMPREHEN METABOLIC PANEL: CPT

## 2022-01-20 PROCEDURE — 82977 ASSAY OF GGT: CPT

## 2022-01-20 PROCEDURE — 83010 ASSAY OF HAPTOGLOBIN QUANT: CPT

## 2022-01-20 PROCEDURE — 90746 HEPB VACCINE 3 DOSE ADULT IM: CPT | Performed by: CLINICAL NURSE SPECIALIST

## 2022-01-20 PROCEDURE — 99215 OFFICE O/P EST HI 40 MIN: CPT | Mod: 25 | Performed by: CLINICAL NURSE SPECIALIST

## 2022-01-20 PROCEDURE — 85025 COMPLETE CBC W/AUTO DIFF WBC: CPT

## 2022-01-20 PROCEDURE — 81003 URINALYSIS AUTO W/O SCOPE: CPT

## 2022-01-20 PROCEDURE — 90471 IMMUNIZATION ADMIN: CPT | Performed by: CLINICAL NURSE SPECIALIST

## 2022-01-20 PROCEDURE — 36415 COLL VENOUS BLD VENIPUNCTURE: CPT

## 2022-01-20 RX ORDER — ERGOCALCIFEROL 1.25 MG/1
CAPSULE ORAL
COMMUNITY

## 2022-01-20 ASSESSMENT — FIBROSIS 4 INDEX: FIB4 SCORE: 2

## 2022-01-20 ASSESSMENT — PATIENT HEALTH QUESTIONNAIRE - PHQ9: CLINICAL INTERPRETATION OF PHQ2 SCORE: 0

## 2022-01-20 NOTE — ASSESSMENT & PLAN NOTE
With losartan 100mg BP initially lowered but recently has been elevated 160/105, 155/95, 185 systolic w headache.

## 2022-01-20 NOTE — ASSESSMENT & PLAN NOTE
Itching started on body x2 weeks off and on.  U/s shows steatosis and hepatomegaly.  Drank alcohol once this week.  Trying to quit smoking.  No RUQ pain or colicky pain.  Constipation with cheese recently, no melena or vivian blood.  No coughing.   She has had Hepatitis A vaccine twice and Hepatitis B vaccine series once.

## 2022-01-20 NOTE — PROGRESS NOTES
Subjective     Ana Sherwood is a 56 y.o. female who presents with Follow-Up (labs), Hypertension, and Headache (1 week )            HPI  Tension headache  Headache on and off but mainly on for 3 weeks.  Now 6/10, worse can't function.  Starts at back of head and moves to front, feels light a vice.  Work stressful.  Stays well hydrated, a pitcher or more a day. Woke at 3AM with frontal headache.  Works at a computer and eyes strained from that.  No dizziness, balance issues, change in smell.   Took aspirin and aleve and they did not help. Started rosuvastain last month.    Elevated liver enzymes  Itching started on body x2 weeks off and on.  U/s shows steatosis and hepatomegaly.  Drank alcohol once this week.  Trying to quit smoking.  No RUQ pain or colicky pain.  Constipation with cheese recently, no melena or vivian blood.  No coughing.   She has had Hepatitis A vaccine twice and Hepatitis B vaccine series once.    Essential hypertension  With losartan 100mg BP initially lowered but recently has been elevated 160/105, 155/95, 185 systolic w headache.        ROS      See HPI  Allergies   Allergen Reactions   • Codeine Vomiting and Nausea   • Vicodin [Hydrocodone-Acetaminophen] Nausea     Small doses with food - patient tolerates    • Eggs      Current Outpatient Medications on File Prior to Visit   Medication Sig Dispense Refill   • vitamin D2, Ergocalciferol, (DRISDOL) 1.25 MG (66304 UT) Cap capsule Take  by mouth every 7 days.     • esomeprazole (NEXIUM) 20 MG capsule Take 1 Capsule by mouth every morning before breakfast. EFORE BREAKFAST 60 Capsule 1   • losartan (COZAAR) 100 MG Tab Take 1 Tablet by mouth every day. 90 Tablet 2   • CLINDAMYCIN PHOSPHATE,TOPICAL, 1 % Lotion Apply daily in the morning to the affected area of the face 60 mL 3   • mometasone (ELOCON) 0.1 % Ointment Apply twice daily to affected areas of the LEGS until the skin is smooth or up to 3 weeks, then stop 1 week. Repeat as needed. 60  "g 2     No current facility-administered medications on file prior to visit.     No visits with results within 1 Month(s) from this visit.   Latest known visit with results is:   Hospital Outpatient Visit on 12/10/2021   Component Date Value   • Ceruloplasmin 12/10/2021 28    • LDH Total 12/10/2021 252    • Hep A Virus Antibody Tot* 12/10/2021 Positive*   • Hepatitis A Virus Ab, IgM 12/10/2021 Negative    • Reticulocyte Count 12/10/2021 2.5*   • Retic, Absolute 12/10/2021 0.11*   • Imm. Reticulocyte Fracti* 12/10/2021 17.7*   • Retic Hgb Equivalent 12/10/2021 38.9*   • Ferritin 12/10/2021 331.0*   • Iron 12/10/2021 153    • Total Iron Binding 12/10/2021 377    • Unsat Iron Binding 12/10/2021 224    • % Saturation 12/10/2021 41    • Hepatitis B Core Ab, Tot* 12/10/2021 NonReactive    • Hepatitis B Cors Ab,IgM 12/10/2021 Non-Reactive    • Copper Serum 12/10/2021 135.0    • Hepatitis B Surface Anti* 12/10/2021 Non-Reactive      US RUQ 12/10/21  IMPRESSION:     1.  Hepatomegaly and steatosis. Fibrosis not excluded. No free fluid.     2.  Surgical absence gallbladder. No dilatation of common duct.        Objective     /82 (BP Location: Left arm, Patient Position: Sitting, BP Cuff Size: Adult)   Pulse 64   Temp 35.9 °C (96.7 °F) (Temporal)   Resp 16   Ht 1.676 m (5' 6\")   Wt 108 kg (237 lb)   LMP 12/20/2017   SpO2 95%   BMI 38.25 kg/m²      Physical Exam  Constitutional:       General: She is not in acute distress.     Appearance: Normal appearance. She is obese. She is not ill-appearing, toxic-appearing or diaphoretic.   HENT:      Head: Normocephalic and atraumatic.   Eyes:      General: No scleral icterus.     Pupils: Pupils are equal, round, and reactive to light.   Cardiovascular:      Rate and Rhythm: Normal rate and regular rhythm.      Heart sounds: Normal heart sounds.   Pulmonary:      Effort: Pulmonary effort is normal.      Breath sounds: Normal breath sounds.   Lymphadenopathy:      Cervical: No " cervical adenopathy.   Skin:     General: Skin is warm and dry.   Neurological:      General: No focal deficit present.      Mental Status: She is alert and oriented to person, place, and time.      Gait: Gait normal.   Psychiatric:         Mood and Affect: Mood normal.         Behavior: Behavior normal.         Thought Content: Thought content normal.         Judgment: Judgment normal.                             Assessment & Plan        1. Tension headache  Likely related to stress and possibly Crestor.  She is not having any neurological symptoms.  Stop rosuvastatin as liver enzymes elevated and imaging shows hepatomegaly and steatosis. Also, headache a less common side effect of Crestor.  If headache symptoms do not improve in one week, let me know.  If the headache worsens or if she develops neurological symptoms, she is to seek emergency care.    2. Elevated liver enzymes  Continue to reduce alcohol use, ideally stop completely.  Ana will call GI consultants to set up an appointment immediately upon leaving this appointment.  - Comp Metabolic Panel; Future  - GAMMA GT (GGT); Future    3. Essential hypertension  Continue losartan 100mg.  I suspect the overall elevated blood pressure has been related to that pain in her head.  We will continue to monitor her blood pressure on this current dose along with this symptoms of headache.  If blood pressure does not reduce to less than 140/90 on a regular basis, we will add a new agent.    4. Elevated MCV    - CBC WITH DIFFERENTIAL; Future    5. Need for vaccination  Received series once before but no antibodies on recent labs.  Works in correctional facility.  First dose of new series completed today.  She is aware of the dosing schedule.  - Hep B Adult 20+    6. hypercholesterolemia.  Ana has had a almost constant headache since starting the Crestor.  The pain is currently 6 out of 10 and can prevent her from doing daily activities.  It has woken her up at  night.  She is to stop the Crestor and report back in 1 week if headache is not improving.  I am in the process of reviewing alternative cholesterol medications as statins can be deleterious for people with liver disease.  She is not a candidate for [facility] project that she lives in [place].     Return in about 1 week (around 1/27/2022).

## 2022-01-20 NOTE — ASSESSMENT & PLAN NOTE
Headache on and off but mainly on for 3 weeks.  Now 6/10, worse can't function.  Starts at back of head and moves to front, feels light a vice.  Work stressful.  Stays well hydrated, a pitcher or more a day. Woke at 3AM with frontal headache.  Works at a computer and eyes strained from that.  No dizziness, balance issues, change in smell.   Took aspirin and aleve and they did not help. Started rosuvastain last month.

## 2022-01-21 DIAGNOSIS — E78.00 PURE HYPERCHOLESTEROLEMIA: ICD-10-CM

## 2022-01-21 RX ORDER — ATORVASTATIN CALCIUM 20 MG/1
20 TABLET, FILM COATED ORAL NIGHTLY
Qty: 30 TABLET | Refills: 0 | Status: SHIPPED | OUTPATIENT
Start: 2022-01-21 | End: 2022-02-22

## 2022-01-21 NOTE — PROGRESS NOTES
1. Pure hypercholesterolemia  Ana will be started on atorvastatin 20mg and, if tolerated, will increase to 40mg. The 10-year ASCVD risk score ([place] GRAZYNA Jr., et al., 2013) is: 12.8%    - atorvastatin (LIPITOR) 20 MG Tab; Take 1 Tablet by mouth every evening.  Dispense: 30 Tablet; Refill: 0    Statins for Chronic Liver Disease and Cirrhosis in ncbi: doi: 10.1007/c90440-263-6680-2  Statins are often under-prescribed in patients with CLD or cirrhosis due to fear to hepatotoxicity. In light of current literature, this fear seems unjustified since statin liver toxicity is a rare event [8••]. Moreover, recent data point to potentially beneficial and previously unexpected effects of statins in CLD and cirrhosis [9]. In this review, we summarize current evidence regarding the influence of statins in endothelial dysfunction in CLD, their ability to modulate hepatic fibrogenesis, and their vasoprotective effects in portal hypertension as well as on existing data about the impact of statins in cirrhosis development, progression, and complications.

## 2022-01-22 LAB — HAPTOGLOB SERPL-MCNC: 238 MG/DL (ref 30–200)

## 2022-02-09 ENCOUNTER — HOSPITAL ENCOUNTER (OUTPATIENT)
Dept: LAB | Facility: MEDICAL CENTER | Age: 57
End: 2022-02-09
Attending: PHYSICIAN ASSISTANT
Payer: COMMERCIAL

## 2022-02-09 LAB
ALBUMIN SERPL BCP-MCNC: 4.9 G/DL (ref 3.2–4.9)
ALP SERPL-CCNC: 110 U/L (ref 30–99)
ALT SERPL-CCNC: 36 U/L (ref 2–50)
AST SERPL-CCNC: 29 U/L (ref 12–45)
BILIRUB CONJ SERPL-MCNC: <0.2 MG/DL (ref 0.1–0.5)
BILIRUB INDIRECT SERPL-MCNC: ABNORMAL MG/DL (ref 0–1)
BILIRUB SERPL-MCNC: 0.4 MG/DL (ref 0.1–1.5)
PROT SERPL-MCNC: 7.7 G/DL (ref 6–8.2)

## 2022-02-09 PROCEDURE — 84080 ASSAY ALKALINE PHOSPHATASES: CPT

## 2022-02-09 PROCEDURE — 82103 ALPHA-1-ANTITRYPSIN TOTAL: CPT

## 2022-02-09 PROCEDURE — 82784 ASSAY IGA/IGD/IGG/IGM EACH: CPT

## 2022-02-09 PROCEDURE — 36415 COLL VENOUS BLD VENIPUNCTURE: CPT

## 2022-02-09 PROCEDURE — 86381 MITOCHONDRIAL ANTIBODY EACH: CPT

## 2022-02-09 PROCEDURE — 80076 HEPATIC FUNCTION PANEL: CPT

## 2022-02-09 PROCEDURE — 86364 TISS TRNSGLTMNASE EA IG CLAS: CPT

## 2022-02-09 PROCEDURE — 86038 ANTINUCLEAR ANTIBODIES: CPT

## 2022-02-09 PROCEDURE — 84075 ASSAY ALKALINE PHOSPHATASE: CPT

## 2022-02-11 LAB
IGA SERPL-MCNC: 150 MG/DL (ref 68–408)
IGG SERPL-MCNC: 1000 MG/DL (ref 768–1632)
IGM SERPL-MCNC: 249 MG/DL (ref 35–263)
MITOCHONDRIA M2 IGG SER-ACNC: 5.6 UNITS (ref 0–24.9)
NUCLEAR IGG SER QL IA: NORMAL
TTG IGA SER IA-ACNC: <2 U/ML (ref 0–3)

## 2022-02-12 LAB
A1AT SERPL-MCNC: 112 MG/DL (ref 90–200)
ALP BONE SERPL-CCNC: 41 U/L (ref 0–55)
ALP ISOS SERPL HS-CCNC: 0 U/L
ALP LIVER SERPL-CCNC: 72 U/L (ref 0–94)
ALP SERPL-CCNC: 113 U/L (ref 40–120)

## 2022-02-22 ENCOUNTER — OFFICE VISIT (OUTPATIENT)
Dept: MEDICAL GROUP | Facility: IMAGING CENTER | Age: 57
End: 2022-02-22
Payer: COMMERCIAL

## 2022-02-22 VITALS
HEIGHT: 66 IN | SYSTOLIC BLOOD PRESSURE: 138 MMHG | DIASTOLIC BLOOD PRESSURE: 76 MMHG | HEART RATE: 75 BPM | WEIGHT: 237 LBS | TEMPERATURE: 98.4 F | OXYGEN SATURATION: 95 % | BODY MASS INDEX: 38.09 KG/M2

## 2022-02-22 DIAGNOSIS — E66.9 OBESITY (BMI 30-39.9): ICD-10-CM

## 2022-02-22 DIAGNOSIS — H92.09 EAR ACHE: ICD-10-CM

## 2022-02-22 DIAGNOSIS — G44.209 TENSION HEADACHE: ICD-10-CM

## 2022-02-22 DIAGNOSIS — I10 ESSENTIAL HYPERTENSION: ICD-10-CM

## 2022-02-22 DIAGNOSIS — Z23 NEED FOR VACCINATION: ICD-10-CM

## 2022-02-22 DIAGNOSIS — H60.502 ACUTE OTITIS EXTERNA OF LEFT EAR, UNSPECIFIED TYPE: ICD-10-CM

## 2022-02-22 DIAGNOSIS — E78.00 PURE HYPERCHOLESTEROLEMIA: ICD-10-CM

## 2022-02-22 DIAGNOSIS — R74.8 ELEVATED LIVER ENZYMES: ICD-10-CM

## 2022-02-22 PROCEDURE — 90746 HEPB VACCINE 3 DOSE ADULT IM: CPT | Performed by: CLINICAL NURSE SPECIALIST

## 2022-02-22 PROCEDURE — 99214 OFFICE O/P EST MOD 30 MIN: CPT | Mod: 25 | Performed by: CLINICAL NURSE SPECIALIST

## 2022-02-22 PROCEDURE — 90471 IMMUNIZATION ADMIN: CPT | Performed by: CLINICAL NURSE SPECIALIST

## 2022-02-22 RX ORDER — CIPROFLOXACIN AND DEXAMETHASONE 3; 1 MG/ML; MG/ML
4 SUSPENSION/ DROPS AURICULAR (OTIC) 2 TIMES DAILY
Qty: 7.5 ML | Refills: 0 | Status: SHIPPED | OUTPATIENT
Start: 2022-02-22 | End: 2022-03-01

## 2022-02-22 RX ORDER — ATORVASTATIN CALCIUM 40 MG/1
40 TABLET, FILM COATED ORAL NIGHTLY
Qty: 90 TABLET | Refills: 1 | Status: SHIPPED | OUTPATIENT
Start: 2022-02-22 | End: 2022-08-04

## 2022-02-22 ASSESSMENT — PAIN SCALES - GENERAL: PAINLEVEL: 7=MODERATE-SEVERE PAIN

## 2022-02-22 ASSESSMENT — FIBROSIS 4 INDEX: FIB4 SCORE: 1.01

## 2022-02-22 NOTE — PROGRESS NOTES
"Subjective     Ana Sherwood is a 57 y.o. female who presents with Medication Management and Immunizations (Flu/ Hep B)            HPI  Essential hypertension  Blood pressure was 116/78 at gastroenterology.    Obesity (BMI 30-39.9)  Taking Benefiber and this has helped reduce bloating and gas.  Her BM's have also regulated.        Pure hypercholesterolemia  Ana is taking her atorvastatin 20mg and has not had any side effects.     Elevated liver enzymes  2/9/22 LFT's improved.  Abdominal discomfort has improved with fiber.     Tension headache  Her headaches have resolved. She has one today but was tense and clenching on the drive over.     Ear ache  Left side throbbing with some sharp pain. Dried blood on a cotton swab once.  No fevers.       ROS  See HPI      Allergies   Allergen Reactions   • Codeine Vomiting and Nausea   • Vicodin [Hydrocodone-Acetaminophen] Nausea     Small doses with food - patient tolerates    • Eggs      Current Outpatient Medications on File Prior to Visit   Medication Sig Dispense Refill   • vitamin D2, Ergocalciferol, (DRISDOL) 1.25 MG (52394 UT) Cap capsule Take  by mouth every 7 days.     • esomeprazole (NEXIUM) 20 MG capsule Take 1 Capsule by mouth every morning before breakfast. EFORE BREAKFAST 60 Capsule 1   • losartan (COZAAR) 100 MG Tab Take 1 Tablet by mouth every day. 90 Tablet 2   • CLINDAMYCIN PHOSPHATE,TOPICAL, 1 % Lotion Apply daily in the morning to the affected area of the face 60 mL 3   • mometasone (ELOCON) 0.1 % Ointment Apply twice daily to affected areas of the LEGS until the skin is smooth or up to 3 weeks, then stop 1 week. Repeat as needed. 60 g 2     No current facility-administered medications on file prior to visit.           Objective     /76 (BP Location: Left arm, Patient Position: Sitting, BP Cuff Size: Adult)   Pulse 75   Temp 36.9 °C (98.4 °F) (Temporal)   Ht 1.676 m (5' 6\")   Wt 108 kg (237 lb)   LMP 12/20/2017   SpO2 95%   BMI 38.25 " kg/m²      Physical Exam  Constitutional:       General: She is not in acute distress.     Appearance: Normal appearance. She is not ill-appearing, toxic-appearing or diaphoretic.   HENT:      Head: Normocephalic and atraumatic.      Right Ear: Tympanic membrane, ear canal and external ear normal. There is no impacted cerumen.      Left Ear: External ear normal. There is no impacted cerumen.      Ears:      Comments: Left TM with red malleus  Left ear canal with anterior red papule and posterior abrasion with mild generalized erythema    Right TM with scant mucus and bubbles  Eyes:      General: No scleral icterus.     Pupils: Pupils are equal, round, and reactive to light.   Cardiovascular:      Rate and Rhythm: Normal rate and regular rhythm.      Heart sounds: Normal heart sounds.   Pulmonary:      Effort: Pulmonary effort is normal.      Breath sounds: Normal breath sounds.   Skin:     General: Skin is warm and dry.   Neurological:      Mental Status: She is alert and oriented to person, place, and time.      Gait: Gait normal.   Psychiatric:         Mood and Affect: Mood normal.         Behavior: Behavior normal.         Thought Content: Thought content normal.         Judgment: Judgment normal.               Hospital Outpatient Visit on 02/09/2022   Component Date Value   • Anti-Mitochondrial Ab 02/09/2022 5.6    • Antinuclear Antibody 02/09/2022 None Detected    • Alkaline Phosphatase 02/09/2022 110 (A)   • AST(SGOT) 02/09/2022 29    • ALT(SGPT) 02/09/2022 36    • Total Bilirubin 02/09/2022 0.4    • Direct Bilirubin 02/09/2022 <0.2    • Indirect Bilirubin 02/09/2022 see below    • Albumin 02/09/2022 4.9    • Total Protein 02/09/2022 7.7    • Immunoglobulin G 02/09/2022 1000    • Immunoglobulin A 02/09/2022 150    • Immunoglobulin M 02/09/2022 249    • t-TG IgA 02/09/2022 <2    • Alkaline Phosphatase 02/09/2022 113    • Liver Fractions 02/09/2022 72    • Bone Fractions 02/09/2022 41    • Alk Phos Other Calc  02/09/2022 0    • Alpha-1-Antitrypsin 02/09/2022 112                        Assessment & Plan        1. Essential hypertension  Controlled. Continue losartan 100mg.    2. Obesity (BMI 30-39.9)  Continue to work on healthy lifestyle choices including quitting cigarette smoking.   - Lipid Profile; Future    3. Pure hypercholesterolemia  Tolerated 20mg atorvastatin well. Increase dose to 40mg.  - atorvastatin (LIPITOR) 40 MG Tab; Take 1 Tablet by mouth every evening.  Dispense: 90 Tablet; Refill: 1  - Lipid Profile; Future  - VITAMIN D,25 HYDROXY; Future    4. Elevated liver enzymes  Improved on recent labs. Continue to monitor.  - Comp Metabolic Panel; Future    5. Tension headache  Improved.  Continue to monitor.    6. Ear ache  Mild erythema with what appears to be a comedone anteriorly in the ear canal. Posterior abrasion possibly from cotton swab or or itching.  Mild erythema throughout canal, no edema. External otitis media likely with pain exacerbated by environmental allergies.    Apply Ciprodex in left ear 4 drops twice daily.  Make appointment with previously established ENT as she has been experiencing intermittent otalgia for some time and as she had dried blood from her left ear.  If this treatment does not resolve the issue or if the pain worsens, return to care.     - ciprofloxacin/dexamethasone (CIPRODEX) 0.3-0.1 % Suspension; Administer 4 Drops into the left ear 2 times a day for 7 days.  Dispense: 7.5 mL; Refill: 0    7. Acute otitis externa of left ear, unspecified type  See #6  - ciprofloxacin/dexamethasone (CIPRODEX) 0.3-0.1 % Suspension; Administer 4 Drops into the left ear 2 times a day for 7 days.  Dispense: 7.5 mL; Refill: 0    8. Need for vaccination  Dose #2  - Hep B Adult 20+                  Return if symptoms worsen or fail to improve, for With test results.

## 2022-05-09 ENCOUNTER — HOSPITAL ENCOUNTER (OUTPATIENT)
Dept: LAB | Facility: MEDICAL CENTER | Age: 57
End: 2022-05-09
Attending: PHYSICIAN ASSISTANT
Payer: COMMERCIAL

## 2022-05-09 ENCOUNTER — HOSPITAL ENCOUNTER (OUTPATIENT)
Dept: LAB | Facility: MEDICAL CENTER | Age: 57
End: 2022-05-09
Attending: CLINICAL NURSE SPECIALIST
Payer: COMMERCIAL

## 2022-05-09 DIAGNOSIS — E78.00 PURE HYPERCHOLESTEROLEMIA: ICD-10-CM

## 2022-05-09 DIAGNOSIS — E66.9 OBESITY (BMI 30-39.9): ICD-10-CM

## 2022-05-09 DIAGNOSIS — R74.8 ELEVATED LIVER ENZYMES: ICD-10-CM

## 2022-05-09 LAB
25(OH)D3 SERPL-MCNC: 42 NG/ML (ref 30–100)
ALBUMIN SERPL BCP-MCNC: 4.6 G/DL (ref 3.2–4.9)
ALBUMIN SERPL BCP-MCNC: 4.6 G/DL (ref 3.2–4.9)
ALBUMIN/GLOB SERPL: 1.6 G/DL
ALP SERPL-CCNC: 121 U/L (ref 30–99)
ALP SERPL-CCNC: 122 U/L (ref 30–99)
ALT SERPL-CCNC: 36 U/L (ref 2–50)
ALT SERPL-CCNC: 39 U/L (ref 2–50)
ANION GAP SERPL CALC-SCNC: 11 MMOL/L (ref 7–16)
AST SERPL-CCNC: 34 U/L (ref 12–45)
AST SERPL-CCNC: 37 U/L (ref 12–45)
BILIRUB CONJ SERPL-MCNC: <0.2 MG/DL (ref 0.1–0.5)
BILIRUB INDIRECT SERPL-MCNC: ABNORMAL MG/DL (ref 0–1)
BILIRUB SERPL-MCNC: 0.4 MG/DL (ref 0.1–1.5)
BILIRUB SERPL-MCNC: 0.4 MG/DL (ref 0.1–1.5)
BUN SERPL-MCNC: 22 MG/DL (ref 8–22)
CALCIUM SERPL-MCNC: 9.5 MG/DL (ref 8.5–10.5)
CHLORIDE SERPL-SCNC: 105 MMOL/L (ref 96–112)
CHOLEST SERPL-MCNC: 191 MG/DL (ref 100–199)
CO2 SERPL-SCNC: 25 MMOL/L (ref 20–33)
CREAT SERPL-MCNC: 0.8 MG/DL (ref 0.5–1.4)
FASTING STATUS PATIENT QL REPORTED: NORMAL
GFR SERPLBLD CREATININE-BSD FMLA CKD-EPI: 87 ML/MIN/1.73 M 2
GLOBULIN SER CALC-MCNC: 2.8 G/DL (ref 1.9–3.5)
GLUCOSE SERPL-MCNC: 103 MG/DL (ref 65–99)
HDLC SERPL-MCNC: 45 MG/DL
LDLC SERPL CALC-MCNC: 70 MG/DL
POTASSIUM SERPL-SCNC: 4.2 MMOL/L (ref 3.6–5.5)
PROT SERPL-MCNC: 7.2 G/DL (ref 6–8.2)
PROT SERPL-MCNC: 7.4 G/DL (ref 6–8.2)
SODIUM SERPL-SCNC: 141 MMOL/L (ref 135–145)
TRIGL SERPL-MCNC: 378 MG/DL (ref 0–149)

## 2022-05-09 PROCEDURE — 80076 HEPATIC FUNCTION PANEL: CPT

## 2022-05-09 PROCEDURE — 36415 COLL VENOUS BLD VENIPUNCTURE: CPT

## 2022-05-09 PROCEDURE — 80061 LIPID PANEL: CPT

## 2022-05-09 PROCEDURE — 80053 COMPREHEN METABOLIC PANEL: CPT

## 2022-05-09 PROCEDURE — 82306 VITAMIN D 25 HYDROXY: CPT

## 2022-05-19 ENCOUNTER — OFFICE VISIT (OUTPATIENT)
Dept: MEDICAL GROUP | Facility: IMAGING CENTER | Age: 57
End: 2022-05-19
Payer: COMMERCIAL

## 2022-05-19 VITALS
SYSTOLIC BLOOD PRESSURE: 140 MMHG | HEART RATE: 62 BPM | DIASTOLIC BLOOD PRESSURE: 84 MMHG | WEIGHT: 237 LBS | OXYGEN SATURATION: 100 % | BODY MASS INDEX: 38.09 KG/M2 | HEIGHT: 66 IN | TEMPERATURE: 98.2 F

## 2022-05-19 DIAGNOSIS — I10 ESSENTIAL HYPERTENSION: ICD-10-CM

## 2022-05-19 DIAGNOSIS — R73.03 PREDIABETES: ICD-10-CM

## 2022-05-19 DIAGNOSIS — R74.8 ELEVATED LIVER ENZYMES: ICD-10-CM

## 2022-05-19 DIAGNOSIS — M25.561 CHRONIC PAIN OF RIGHT KNEE: ICD-10-CM

## 2022-05-19 DIAGNOSIS — E78.2 MIXED HYPERLIPIDEMIA: ICD-10-CM

## 2022-05-19 DIAGNOSIS — G44.209 TENSION HEADACHE: ICD-10-CM

## 2022-05-19 DIAGNOSIS — G89.29 CHRONIC PAIN OF RIGHT KNEE: ICD-10-CM

## 2022-05-19 PROBLEM — H92.09 EAR ACHE: Status: RESOLVED | Noted: 2021-12-01 | Resolved: 2022-05-19

## 2022-05-19 PROCEDURE — 99214 OFFICE O/P EST MOD 30 MIN: CPT | Performed by: CLINICAL NURSE SPECIALIST

## 2022-05-19 RX ORDER — LOTEPREDNOL ETABONATE 3.8 MG/G
GEL OPHTHALMIC
COMMUNITY
Start: 2022-03-21

## 2022-05-19 ASSESSMENT — PAIN SCALES - GENERAL: PAINLEVEL: 9=SEVERE PAIN

## 2022-05-19 ASSESSMENT — FIBROSIS 4 INDEX: FIB4 SCORE: 1.24

## 2022-05-19 NOTE — ASSESSMENT & PLAN NOTE
Alkaline phosphatase remains elevated at 121 but other liver enzymes in healthy range. Gamma GT from 1/2022 elevated.

## 2022-05-19 NOTE — PROGRESS NOTES
Subjective     Ana Sherwood is a 57 y.o. female who presents with Lab Results and Referral Needed ([facility] /)            HPI  Pati is in clinic for lab review.    Mixed hyperlipidemia  Triglycerides 378 on recent labs, improved from 431. Other cholesterol levels in healthy range.     Elevated liver enzymes  Alkaline phosphatase remains elevated at 121 but other liver enzymes in healthy range. Gamma GT from 2022 elevated.       Tension headache  Resolved    Chronic pain of right knee  Pain with going up and down stairs.  Referral .    Essential hypertension  Pharmacy reading recently 130's/low 80's.  Taking losartan 100mg.        ROS  See HPI      Allergies   Allergen Reactions   • Codeine Vomiting and Nausea   • Vicodin [Hydrocodone-Acetaminophen] Nausea     Small doses with food - patient tolerates    • Eggs        Current Outpatient Medications on File Prior to Visit   Medication Sig Dispense Refill   • Ascorbic Acid (VITAMIN C PO) Take  by mouth.     • Cyanocobalamin (VITAMIN B-12 PO) Take  by mouth.     • VITAMIN E PO Take  by mouth.     • esomeprazole (NEXIUM) 20 MG capsule take 1 capsule by mouth EVERY MORNING BEFORE BREAKFAST 90 Capsule 1   • atorvastatin (LIPITOR) 40 MG Tab Take 1 Tablet by mouth every evening. 90 Tablet 1   • vitamin D2, Ergocalciferol, (DRISDOL) 1.25 MG (50267 UT) Cap capsule Take  by mouth every 7 days.     • losartan (COZAAR) 100 MG Tab Take 1 Tablet by mouth every day. 90 Tablet 2   • CLINDAMYCIN PHOSPHATE,TOPICAL, 1 % Lotion Apply daily in the morning to the affected area of the face 60 mL 3   • mometasone (ELOCON) 0.1 % Ointment Apply twice daily to affected areas of the LEGS until the skin is smooth or up to 3 weeks, then stop 1 week. Repeat as needed. 60 g 2   • LOTEMAX SM 0.38 % Gel instill 1 drop into both eyes three times a day       No current facility-administered medications on file prior to visit.           Objective     BP (!) 140/84 (BP Location:  "Left arm, Patient Position: Sitting, BP Cuff Size: Adult)   Pulse 62   Temp 36.8 °C (98.2 °F) (Temporal)   Ht 1.676 m (5' 6\")   Wt 108 kg (237 lb)   LMP 12/20/2017   SpO2 100%   BMI 38.25 kg/m²      Physical Exam  Constitutional:       General: She is not in acute distress.     Appearance: Normal appearance. She is not ill-appearing, toxic-appearing or diaphoretic.   HENT:      Head: Normocephalic and atraumatic.   Eyes:      General: No scleral icterus.     Pupils: Pupils are equal, round, and reactive to light.   Cardiovascular:      Rate and Rhythm: Normal rate and regular rhythm.      Heart sounds: Normal heart sounds.   Pulmonary:      Effort: Pulmonary effort is normal.      Breath sounds: Normal breath sounds.   Skin:     General: Skin is warm and dry.   Neurological:      Mental Status: She is alert and oriented to person, place, and time.      Gait: Gait normal.   Psychiatric:         Mood and Affect: Mood normal.         Behavior: Behavior normal.         Thought Content: Thought content normal.         Judgment: Judgment normal.               Hospital Outpatient Visit on 05/09/2022   Component Date Value   • Sodium 05/09/2022 141    • Potassium 05/09/2022 4.2    • Chloride 05/09/2022 105    • Co2 05/09/2022 25    • Anion Gap 05/09/2022 11.0    • Glucose 05/09/2022 103 (A)   • Bun 05/09/2022 22    • Creatinine 05/09/2022 0.80    • Calcium 05/09/2022 9.5    • AST(SGOT) 05/09/2022 34    • ALT(SGPT) 05/09/2022 36    • Alkaline Phosphatase 05/09/2022 122 (A)   • Total Bilirubin 05/09/2022 0.4    • Albumin 05/09/2022 4.6    • Total Protein 05/09/2022 7.4    • Globulin 05/09/2022 2.8    • A-G Ratio 05/09/2022 1.6    • Cholesterol,Tot 05/09/2022 191    • Triglycerides 05/09/2022 378 (A)   • HDL 05/09/2022 45    • LDL 05/09/2022 70    • 25-Hydroxy   Vitamin D 25 05/09/2022 42    • Fasting Status 05/09/2022 Fasting    • GFR (CKD-EPI) 05/09/2022 87    Hospital Outpatient Visit on 05/09/2022   Component Date " Value   • Alkaline Phosphatase 05/09/2022 121 (A)   • AST(SGOT) 05/09/2022 37    • ALT(SGPT) 05/09/2022 39    • Total Bilirubin 05/09/2022 0.4    • Direct Bilirubin 05/09/2022 <0.2    • Indirect Bilirubin 05/09/2022 see below    • Albumin 05/09/2022 4.6    • Total Protein 05/09/2022 7.2                        Assessment & Plan       1. Mixed hyperlipidemia  Levels improving. Continue atorvastatin 40mg.  Recheck in 6 months.  - CBC WITH DIFFERENTIAL; Future  - Lipid Profile; Future    2. Elevated liver enzymes  Monitored by gastroenterology as she also has hepatic steatosis.  Continue to reduce alcohol intake and work on weight loss.      - Comp Metabolic Panel; Future    3. Tension headache  Resolved    4. Chronic pain of right knee  Referral placed again for orthopedics.    - Referral to Orthopedics    5. Essential hypertension  Elevated in clinic today.  She will monitor at home and report if levels continue at 140/90 or higher. Continue losartan 100mg.    6. Prediabetes  Recent fasting glucose 108.  Repeat labs in 6 months.  - Comp Metabolic Panel; Future  - HEMOGLOBIN A1C; Future    Return in about 6 months (around 11/19/2022), or if symptoms worsen or fail to improve, for With test results.

## 2022-07-21 ENCOUNTER — APPOINTMENT (OUTPATIENT)
Dept: DERMATOLOGY | Facility: IMAGING CENTER | Age: 57
End: 2022-07-21

## 2022-07-21 ENCOUNTER — OFFICE VISIT (OUTPATIENT)
Dept: DERMATOLOGY | Facility: IMAGING CENTER | Age: 57
End: 2022-07-21
Payer: COMMERCIAL

## 2022-07-21 DIAGNOSIS — L73.8 SEBACEOUS HYPERPLASIA: ICD-10-CM

## 2022-07-21 DIAGNOSIS — Z12.83 SKIN CANCER SCREENING: ICD-10-CM

## 2022-07-21 DIAGNOSIS — L81.4 LENTIGINES: ICD-10-CM

## 2022-07-21 DIAGNOSIS — D22.9 MULTIPLE NEVI: ICD-10-CM

## 2022-07-21 DIAGNOSIS — L30.0 NUMMULAR DERMATITIS: ICD-10-CM

## 2022-07-21 DIAGNOSIS — L91.8 ACROCHORDON: ICD-10-CM

## 2022-07-21 DIAGNOSIS — L70.0 ACNE VULGARIS: ICD-10-CM

## 2022-07-21 DIAGNOSIS — D18.01 CHERRY ANGIOMA: ICD-10-CM

## 2022-07-21 PROCEDURE — 99214 OFFICE O/P EST MOD 30 MIN: CPT | Mod: 25 | Performed by: NURSE PRACTITIONER

## 2022-07-21 PROCEDURE — 11200 RMVL SKIN TAGS UP TO&INC 15: CPT | Performed by: NURSE PRACTITIONER

## 2022-07-21 RX ORDER — MOMETASONE FUROATE 1 MG/G
OINTMENT TOPICAL
Qty: 60 G | Refills: 2 | Status: SHIPPED | OUTPATIENT
Start: 2022-07-21 | End: 2022-07-21 | Stop reason: SDUPTHER

## 2022-07-21 RX ORDER — MOMETASONE FUROATE 1 MG/G
OINTMENT TOPICAL
Qty: 60 G | Refills: 2 | Status: SHIPPED | OUTPATIENT
Start: 2022-07-21

## 2022-07-21 RX ORDER — CLINDAMYCIN PHOSPHATE 10 UG/ML
LOTION TOPICAL
Qty: 60 ML | Refills: 3 | Status: SHIPPED | OUTPATIENT
Start: 2022-07-21

## 2022-07-21 NOTE — PROGRESS NOTES
DERMATOLOGY NOTE  FOLLOW UP VISIT       Chief complaint: Follow-Up (NANCY )     Discuss skin tag removal axilla     History of skin cancer: No  History of biopsies right cheek over 10 years benign   History of blistering/severe sunburns:Yes, Details: child   Family history of skin cancer:No  Family history of atypical moles:No  Refill on mometasone   Initial hx:Nummular dermatitis  HPI: rash on left ankle , calf   Started as a white bump and turned into rash   Some redness , raised , itchiness    Onset: 5  months on off   Aggravating factors: shaving irritation , dust   Alleviating factors: soak in hot water and peppermint  oil   polysporin, benadryl   New creams/topicals: none   New medications (up to last 6 months): none   New travel: no   Other exposures: 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     REVIEW OF SYSTEMS:   Positive for skin (see HPI)  Negative for fevers and chills       EXAM:  LMP 12/20/2017   Constitutional: Well-developed, well-nourished, and in no distress.     A total body skin exam was performed excluding the genitals per patient preference and including the following areas: head (including face), neck, chest, abdomen, groin/buttocks, back, bilateral upper extremities, and bilateral lower extremities with the following pertinent findings listed below and/or in assessment/plan.     -sun exposed skin of trunk and b/l upper, lower extremities and face with scattered clinically benign light brown reticulated macules all of which were morphologically similar and none of which were suspicious for skin cancer today on exam  Several  scattered 1-3mm bright red macules and thin papules on the trunk and extremities  Multiple light brown medium brown skin-colored macules papules scattered over the trunk, face extremities, All with benign-appearing pigment network patterns on dermoscopy  Few scattered yellowish/red papules with telangiectasias and central dell on forehead and central face  1-2mm skin-colored to hyperpigmented, soft, pedunculated papules, bilateral axilla  Nummular eczema, LLE    IMPRESSION / PLAN:    1. Lentigines  - Benign-appearing nature of lesions discussed during exam.   - Advised to continue to monitor for any return to clinic for new or concerning changes.        2. Cherry angioma  - Benign-appearing nature of lesions discussed during exam.   - Advised to continue to monitor for any return to clinic for new or concerning changes.      3. Acrochordon  - Benign-appearing nature of lesions discussed during exam.    2 tags in R axilla irritation and traumatized by clothing and friction, thus LN2 applied:  CRYOTHERAPY:  Risks (including, but not limited to: skin discoloration, redness, blister, blood blister, recurrence, need for further treatment, infection, scar) and benefits of cryotherapy discussed. Patient verbally agreed to proceed with treatment. 2 cryotherapy freeze thaw cycles of 10 seconds were applied to 2 lesions on R axilla with cryac. Patient tolerated procedure well. Aftercare instructions given--no specific care needed unless irritated during healing process, can apply Vaseline with small band-aid if needed.    - Advised to continue to monitor for any return to clinic for new or concerning changes.      4. Multiple nevi  - Benign-appearing nature of lesions discussed during exam.   - Advised to continue to monitor for any return to clinic for new or concerning changes.  - ABCDE's of melanoma discussed      5. Sebaceous hyperplasia  - Benign-appearing nature of lesions discussed during exam.   - Advised to continue  to monitor for any return to clinic for new or concerning changes.      6. Nummular dermatitis  Requesting refill, see below  - mometasone (ELOCON) 0.1 % Ointment; Apply twice daily to affected areas of the LEGS until the skin is smooth or up to 3 weeks, then stop 1 week. Repeat as needed.  Dispense: 60 g; Refill: 2    7. Skin cancer screening  Skin cancer education  - discussed importance of sun protective clothing, eyewear in addition to the use of broad spectrum sunscreen with SPF 30 or greater, as well as need for reapplication ~every 2 hours when exposed to UVR  - discussed importance following up for any new or changing lesions as noted in handout given, but every 12 months exams in clinic in the setting of dermatologic history  - ABCDE's of melanoma discussed/handout given        8. Acne vulgaris  Requesting refill on Rx below  - CLINDAMYCIN PHOSPHATE,TOPICAL, 1 % Lotion; Apply daily in the morning to the affected area of the face  Dispense: 60 mL; Refill: 3        Discussed risks, benefits, alternative treatments as well as common side effects associated with prescribed treatment, Patient verbalized understanding and agrees with plan regarding the above         Please note that this dictation was created using voice recognition software. I have made every reasonable attempt to correct obvious errors, but I expect that there are errors of grammar and possibly content that I did not discover before finalizing the note.      Return to clinic in: Return in about 1 year (around 7/21/2023) for NANCY. and as needed for any new or changing skin lesions.

## 2022-08-04 DIAGNOSIS — I10 ESSENTIAL HYPERTENSION: ICD-10-CM

## 2022-08-04 DIAGNOSIS — E78.00 PURE HYPERCHOLESTEROLEMIA: ICD-10-CM

## 2022-08-04 RX ORDER — LOSARTAN POTASSIUM 100 MG/1
TABLET ORAL
Qty: 90 TABLET | Refills: 2 | Status: SHIPPED | OUTPATIENT
Start: 2022-08-04 | End: 2023-04-17

## 2022-08-04 RX ORDER — ATORVASTATIN CALCIUM 40 MG/1
40 TABLET, FILM COATED ORAL NIGHTLY
Qty: 90 TABLET | Refills: 1 | Status: SHIPPED | OUTPATIENT
Start: 2022-08-04 | End: 2023-01-23

## 2022-09-13 ENCOUNTER — APPOINTMENT (OUTPATIENT)
Dept: DERMATOLOGY | Facility: IMAGING CENTER | Age: 57
End: 2022-09-13

## 2022-09-13 ENCOUNTER — HOSPITAL ENCOUNTER (OUTPATIENT)
Dept: RADIOLOGY | Facility: MEDICAL CENTER | Age: 57
End: 2022-09-13
Attending: CLINICAL NURSE SPECIALIST
Payer: COMMERCIAL

## 2022-09-13 DIAGNOSIS — Z12.31 ENCOUNTER FOR SCREENING MAMMOGRAM FOR BREAST CANCER: ICD-10-CM

## 2022-09-13 PROCEDURE — 77063 BREAST TOMOSYNTHESIS BI: CPT

## 2022-09-22 NOTE — Clinical Note
Can you schedule 3 month f/u for nummular derm/spot check? Thanks! Griseofulvin Counseling:  I discussed with the patient the risks of griseofulvin including but not limited to photosensitivity, cytopenia, liver damage, nausea/vomiting and severe allergy.  The patient understands that this medication is best absorbed when taken with a fatty meal (e.g., ice cream or french fries).

## 2022-10-27 DIAGNOSIS — K21.9 GASTROESOPHAGEAL REFLUX DISEASE WITHOUT ESOPHAGITIS: ICD-10-CM

## 2022-11-10 ENCOUNTER — APPOINTMENT (OUTPATIENT)
Dept: DERMATOLOGY | Facility: IMAGING CENTER | Age: 57
End: 2022-11-10

## 2023-01-20 DIAGNOSIS — E78.00 PURE HYPERCHOLESTEROLEMIA: ICD-10-CM

## 2023-01-23 RX ORDER — ATORVASTATIN CALCIUM 40 MG/1
40 TABLET, FILM COATED ORAL NIGHTLY
Qty: 90 TABLET | Refills: 1 | Status: SHIPPED | OUTPATIENT
Start: 2023-01-23 | End: 2023-08-10 | Stop reason: SDUPTHER

## 2023-02-20 ENCOUNTER — HOSPITAL ENCOUNTER (OUTPATIENT)
Dept: LAB | Facility: MEDICAL CENTER | Age: 58
End: 2023-02-20
Attending: PHYSICIAN ASSISTANT
Payer: COMMERCIAL

## 2023-02-20 ENCOUNTER — HOSPITAL ENCOUNTER (OUTPATIENT)
Dept: LAB | Facility: MEDICAL CENTER | Age: 58
End: 2023-02-20
Attending: CLINICAL NURSE SPECIALIST
Payer: COMMERCIAL

## 2023-02-20 DIAGNOSIS — E78.2 MIXED HYPERLIPIDEMIA: ICD-10-CM

## 2023-02-20 DIAGNOSIS — R74.8 ELEVATED LIVER ENZYMES: ICD-10-CM

## 2023-02-20 DIAGNOSIS — R73.03 PREDIABETES: ICD-10-CM

## 2023-02-20 LAB
ALBUMIN SERPL BCP-MCNC: 4.4 G/DL (ref 3.2–4.9)
ALBUMIN SERPL BCP-MCNC: 4.4 G/DL (ref 3.2–4.9)
ALBUMIN/GLOB SERPL: 1.5 G/DL
ALP SERPL-CCNC: 95 U/L (ref 30–99)
ALP SERPL-CCNC: 97 U/L (ref 30–99)
ALT SERPL-CCNC: 33 U/L (ref 2–50)
ALT SERPL-CCNC: 34 U/L (ref 2–50)
ANION GAP SERPL CALC-SCNC: 13 MMOL/L (ref 7–16)
AST SERPL-CCNC: 35 U/L (ref 12–45)
AST SERPL-CCNC: 35 U/L (ref 12–45)
BASOPHILS # BLD AUTO: 1.1 % (ref 0–1.8)
BASOPHILS # BLD: 0.06 K/UL (ref 0–0.12)
BILIRUB CONJ SERPL-MCNC: <0.2 MG/DL (ref 0.1–0.5)
BILIRUB INDIRECT SERPL-MCNC: NORMAL MG/DL (ref 0–1)
BILIRUB SERPL-MCNC: 0.5 MG/DL (ref 0.1–1.5)
BILIRUB SERPL-MCNC: 0.5 MG/DL (ref 0.1–1.5)
BUN SERPL-MCNC: 18 MG/DL (ref 8–22)
CALCIUM ALBUM COR SERPL-MCNC: 9.3 MG/DL (ref 8.5–10.5)
CALCIUM SERPL-MCNC: 9.6 MG/DL (ref 8.4–10.2)
CHLORIDE SERPL-SCNC: 101 MMOL/L (ref 96–112)
CHOLEST SERPL-MCNC: 226 MG/DL (ref 100–199)
CO2 SERPL-SCNC: 25 MMOL/L (ref 20–33)
CREAT SERPL-MCNC: 0.88 MG/DL (ref 0.5–1.4)
EOSINOPHIL # BLD AUTO: 0.13 K/UL (ref 0–0.51)
EOSINOPHIL NFR BLD: 2.4 % (ref 0–6.9)
ERYTHROCYTE [DISTWIDTH] IN BLOOD BY AUTOMATED COUNT: 45.9 FL (ref 35.9–50)
EST. AVERAGE GLUCOSE BLD GHB EST-MCNC: 123 MG/DL
FASTING STATUS PATIENT QL REPORTED: NORMAL
GFR SERPLBLD CREATININE-BSD FMLA CKD-EPI: 77 ML/MIN/1.73 M 2
GLOBULIN SER CALC-MCNC: 2.9 G/DL (ref 1.9–3.5)
GLUCOSE SERPL-MCNC: 98 MG/DL (ref 65–99)
HBA1C MFR BLD: 5.9 % (ref 4–5.6)
HCT VFR BLD AUTO: 35.1 % (ref 37–47)
HDLC SERPL-MCNC: 50 MG/DL
HGB BLD-MCNC: 12.1 G/DL (ref 12–16)
IMM GRANULOCYTES # BLD AUTO: 0.04 K/UL (ref 0–0.11)
IMM GRANULOCYTES NFR BLD AUTO: 0.7 % (ref 0–0.9)
LDLC SERPL CALC-MCNC: ABNORMAL MG/DL
LYMPHOCYTES # BLD AUTO: 2.2 K/UL (ref 1–4.8)
LYMPHOCYTES NFR BLD: 41 % (ref 22–41)
MCH RBC QN AUTO: 33.3 PG (ref 27–33)
MCHC RBC AUTO-ENTMCNC: 34.5 G/DL (ref 33.6–35)
MCV RBC AUTO: 96.7 FL (ref 81.4–97.8)
MONOCYTES # BLD AUTO: 0.35 K/UL (ref 0–0.85)
MONOCYTES NFR BLD AUTO: 6.5 % (ref 0–13.4)
NEUTROPHILS # BLD AUTO: 2.59 K/UL (ref 2–7.15)
NEUTROPHILS NFR BLD: 48.3 % (ref 44–72)
NRBC # BLD AUTO: 0 K/UL
NRBC BLD-RTO: 0 /100 WBC
PLATELET # BLD AUTO: 209 K/UL (ref 164–446)
PMV BLD AUTO: 10.7 FL (ref 9–12.9)
POTASSIUM SERPL-SCNC: 3.9 MMOL/L (ref 3.6–5.5)
PROT SERPL-MCNC: 7.3 G/DL (ref 6–8.2)
PROT SERPL-MCNC: 7.3 G/DL (ref 6–8.2)
RBC # BLD AUTO: 3.63 M/UL (ref 4.2–5.4)
SODIUM SERPL-SCNC: 139 MMOL/L (ref 135–145)
TRIGL SERPL-MCNC: 451 MG/DL (ref 0–149)
WBC # BLD AUTO: 5.4 K/UL (ref 4.8–10.8)

## 2023-02-20 PROCEDURE — 80061 LIPID PANEL: CPT

## 2023-02-20 PROCEDURE — 80053 COMPREHEN METABOLIC PANEL: CPT

## 2023-02-20 PROCEDURE — 36415 COLL VENOUS BLD VENIPUNCTURE: CPT

## 2023-02-20 PROCEDURE — 83036 HEMOGLOBIN GLYCOSYLATED A1C: CPT

## 2023-02-20 PROCEDURE — 80076 HEPATIC FUNCTION PANEL: CPT

## 2023-02-20 PROCEDURE — 85025 COMPLETE CBC W/AUTO DIFF WBC: CPT

## 2023-08-09 SDOH — ECONOMIC STABILITY: TRANSPORTATION INSECURITY
IN THE PAST 12 MONTHS, HAS LACK OF RELIABLE TRANSPORTATION KEPT YOU FROM MEDICAL APPOINTMENTS, MEETINGS, WORK OR FROM GETTING THINGS NEEDED FOR DAILY LIVING?: NO

## 2023-08-09 SDOH — ECONOMIC STABILITY: FOOD INSECURITY: WITHIN THE PAST 12 MONTHS, THE FOOD YOU BOUGHT JUST DIDN'T LAST AND YOU DIDN'T HAVE MONEY TO GET MORE.: NEVER TRUE

## 2023-08-09 SDOH — ECONOMIC STABILITY: INCOME INSECURITY: HOW HARD IS IT FOR YOU TO PAY FOR THE VERY BASICS LIKE FOOD, HOUSING, MEDICAL CARE, AND HEATING?: NOT HARD AT ALL

## 2023-08-09 SDOH — ECONOMIC STABILITY: INCOME INSECURITY: IN THE LAST 12 MONTHS, WAS THERE A TIME WHEN YOU WERE NOT ABLE TO PAY THE MORTGAGE OR RENT ON TIME?: NO

## 2023-08-09 SDOH — ECONOMIC STABILITY: HOUSING INSECURITY: IN THE LAST 12 MONTHS, HOW MANY PLACES HAVE YOU LIVED?: 1

## 2023-08-09 SDOH — HEALTH STABILITY: MENTAL HEALTH
STRESS IS WHEN SOMEONE FEELS TENSE, NERVOUS, ANXIOUS, OR CAN'T SLEEP AT NIGHT BECAUSE THEIR MIND IS TROUBLED. HOW STRESSED ARE YOU?: NOT AT ALL

## 2023-08-09 SDOH — ECONOMIC STABILITY: HOUSING INSECURITY
IN THE LAST 12 MONTHS, WAS THERE A TIME WHEN YOU DID NOT HAVE A STEADY PLACE TO SLEEP OR SLEPT IN A SHELTER (INCLUDING NOW)?: NO

## 2023-08-09 SDOH — HEALTH STABILITY: PHYSICAL HEALTH: ON AVERAGE, HOW MANY DAYS PER WEEK DO YOU ENGAGE IN MODERATE TO STRENUOUS EXERCISE (LIKE A BRISK WALK)?: 5 DAYS

## 2023-08-09 SDOH — ECONOMIC STABILITY: TRANSPORTATION INSECURITY
IN THE PAST 12 MONTHS, HAS THE LACK OF TRANSPORTATION KEPT YOU FROM MEDICAL APPOINTMENTS OR FROM GETTING MEDICATIONS?: NO

## 2023-08-09 SDOH — ECONOMIC STABILITY: TRANSPORTATION INSECURITY
IN THE PAST 12 MONTHS, HAS LACK OF TRANSPORTATION KEPT YOU FROM MEETINGS, WORK, OR FROM GETTING THINGS NEEDED FOR DAILY LIVING?: NO

## 2023-08-09 SDOH — HEALTH STABILITY: PHYSICAL HEALTH: ON AVERAGE, HOW MANY MINUTES DO YOU ENGAGE IN EXERCISE AT THIS LEVEL?: 60 MIN

## 2023-08-09 SDOH — ECONOMIC STABILITY: FOOD INSECURITY: WITHIN THE PAST 12 MONTHS, YOU WORRIED THAT YOUR FOOD WOULD RUN OUT BEFORE YOU GOT MONEY TO BUY MORE.: NEVER TRUE

## 2023-08-09 ASSESSMENT — SOCIAL DETERMINANTS OF HEALTH (SDOH)
HOW OFTEN DO YOU ATTEND CHURCH OR RELIGIOUS SERVICES?: NEVER
DO YOU BELONG TO ANY CLUBS OR ORGANIZATIONS SUCH AS CHURCH GROUPS UNIONS, FRATERNAL OR ATHLETIC GROUPS, OR SCHOOL GROUPS?: NO
WITHIN THE PAST 12 MONTHS, YOU WORRIED THAT YOUR FOOD WOULD RUN OUT BEFORE YOU GOT THE MONEY TO BUY MORE: NEVER TRUE
HOW OFTEN DO YOU ATTENT MEETINGS OF THE CLUB OR ORGANIZATION YOU BELONG TO?: NEVER
DO YOU BELONG TO ANY CLUBS OR ORGANIZATIONS SUCH AS CHURCH GROUPS UNIONS, FRATERNAL OR ATHLETIC GROUPS, OR SCHOOL GROUPS?: NO
ARE YOU MARRIED, WIDOWED, DIVORCED, SEPARATED, NEVER MARRIED, OR LIVING WITH A PARTNER?: NEVER MARRIED
HOW OFTEN DO YOU HAVE SIX OR MORE DRINKS ON ONE OCCASION: MONTHLY
HOW OFTEN DO YOU ATTENT MEETINGS OF THE CLUB OR ORGANIZATION YOU BELONG TO?: NEVER
ARE YOU MARRIED, WIDOWED, DIVORCED, SEPARATED, NEVER MARRIED, OR LIVING WITH A PARTNER?: NEVER MARRIED
IN A TYPICAL WEEK, HOW MANY TIMES DO YOU TALK ON THE PHONE WITH FAMILY, FRIENDS, OR NEIGHBORS?: MORE THAN THREE TIMES A WEEK
HOW OFTEN DO YOU HAVE A DRINK CONTAINING ALCOHOL: 2-3 TIMES A WEEK
HOW OFTEN DO YOU GET TOGETHER WITH FRIENDS OR RELATIVES?: ONCE A WEEK
HOW HARD IS IT FOR YOU TO PAY FOR THE VERY BASICS LIKE FOOD, HOUSING, MEDICAL CARE, AND HEATING?: NOT HARD AT ALL
HOW MANY DRINKS CONTAINING ALCOHOL DO YOU HAVE ON A TYPICAL DAY WHEN YOU ARE DRINKING: 3 OR 4
HOW OFTEN DO YOU GET TOGETHER WITH FRIENDS OR RELATIVES?: ONCE A WEEK
IN A TYPICAL WEEK, HOW MANY TIMES DO YOU TALK ON THE PHONE WITH FAMILY, FRIENDS, OR NEIGHBORS?: MORE THAN THREE TIMES A WEEK
HOW OFTEN DO YOU ATTEND CHURCH OR RELIGIOUS SERVICES?: NEVER

## 2023-08-09 ASSESSMENT — LIFESTYLE VARIABLES
HOW OFTEN DO YOU HAVE SIX OR MORE DRINKS ON ONE OCCASION: MONTHLY
SKIP TO QUESTIONS 9-10: 0
HOW OFTEN DO YOU HAVE A DRINK CONTAINING ALCOHOL: 2-3 TIMES A WEEK
HOW MANY STANDARD DRINKS CONTAINING ALCOHOL DO YOU HAVE ON A TYPICAL DAY: 3 OR 4
AUDIT-C TOTAL SCORE: 6

## 2023-08-10 ENCOUNTER — OFFICE VISIT (OUTPATIENT)
Dept: MEDICAL GROUP | Facility: MEDICAL CENTER | Age: 58
End: 2023-08-10
Payer: COMMERCIAL

## 2023-08-10 ENCOUNTER — HOSPITAL ENCOUNTER (OUTPATIENT)
Dept: LAB | Facility: MEDICAL CENTER | Age: 58
End: 2023-08-10
Attending: PHYSICIAN ASSISTANT
Payer: COMMERCIAL

## 2023-08-10 VITALS
TEMPERATURE: 97.7 F | RESPIRATION RATE: 18 BRPM | OXYGEN SATURATION: 93 % | HEART RATE: 60 BPM | BODY MASS INDEX: 37.39 KG/M2 | DIASTOLIC BLOOD PRESSURE: 96 MMHG | SYSTOLIC BLOOD PRESSURE: 156 MMHG | WEIGHT: 238.2 LBS | HEIGHT: 67 IN

## 2023-08-10 DIAGNOSIS — R73.03 PREDIABETES: ICD-10-CM

## 2023-08-10 DIAGNOSIS — R53.83 OTHER FATIGUE: ICD-10-CM

## 2023-08-10 DIAGNOSIS — Z00.00 ENCOUNTER FOR MEDICAL EXAMINATION TO ESTABLISH CARE: ICD-10-CM

## 2023-08-10 DIAGNOSIS — E78.00 PURE HYPERCHOLESTEROLEMIA: ICD-10-CM

## 2023-08-10 DIAGNOSIS — I10 ESSENTIAL HYPERTENSION: ICD-10-CM

## 2023-08-10 DIAGNOSIS — E78.1 HYPERTRIGLYCERIDEMIA: ICD-10-CM

## 2023-08-10 DIAGNOSIS — E78.2 MIXED HYPERLIPIDEMIA: ICD-10-CM

## 2023-08-10 DIAGNOSIS — K21.9 GASTROESOPHAGEAL REFLUX DISEASE WITHOUT ESOPHAGITIS: ICD-10-CM

## 2023-08-10 LAB
ALBUMIN SERPL BCP-MCNC: 4.7 G/DL (ref 3.2–4.9)
ALBUMIN/GLOB SERPL: 1.7 G/DL
ALP SERPL-CCNC: 112 U/L (ref 30–99)
ALT SERPL-CCNC: 31 U/L (ref 2–50)
ANION GAP SERPL CALC-SCNC: 12 MMOL/L (ref 7–16)
AST SERPL-CCNC: 33 U/L (ref 12–45)
BASOPHILS # BLD AUTO: 0.9 % (ref 0–1.8)
BASOPHILS # BLD: 0.05 K/UL (ref 0–0.12)
BILIRUB SERPL-MCNC: 0.4 MG/DL (ref 0.1–1.5)
BUN SERPL-MCNC: 18 MG/DL (ref 8–22)
CALCIUM ALBUM COR SERPL-MCNC: 9.3 MG/DL (ref 8.5–10.5)
CALCIUM SERPL-MCNC: 9.9 MG/DL (ref 8.4–10.2)
CHLORIDE SERPL-SCNC: 104 MMOL/L (ref 96–112)
CHOLEST SERPL-MCNC: 234 MG/DL (ref 100–199)
CO2 SERPL-SCNC: 23 MMOL/L (ref 20–33)
CREAT SERPL-MCNC: 0.84 MG/DL (ref 0.5–1.4)
EOSINOPHIL # BLD AUTO: 0.19 K/UL (ref 0–0.51)
EOSINOPHIL NFR BLD: 3.3 % (ref 0–6.9)
ERYTHROCYTE [DISTWIDTH] IN BLOOD BY AUTOMATED COUNT: 46.9 FL (ref 35.9–50)
FASTING STATUS PATIENT QL REPORTED: NORMAL
GFR SERPLBLD CREATININE-BSD FMLA CKD-EPI: 81 ML/MIN/1.73 M 2
GLOBULIN SER CALC-MCNC: 2.8 G/DL (ref 1.9–3.5)
GLUCOSE SERPL-MCNC: 97 MG/DL (ref 65–99)
HCT VFR BLD AUTO: 36.1 % (ref 37–47)
HDLC SERPL-MCNC: 52 MG/DL
HGB BLD-MCNC: 12.5 G/DL (ref 12–16)
IMM GRANULOCYTES # BLD AUTO: 0.04 K/UL (ref 0–0.11)
IMM GRANULOCYTES NFR BLD AUTO: 0.7 % (ref 0–0.9)
LDLC SERPL CALC-MCNC: ABNORMAL MG/DL
LYMPHOCYTES # BLD AUTO: 2.29 K/UL (ref 1–4.8)
LYMPHOCYTES NFR BLD: 39.3 % (ref 22–41)
MCH RBC QN AUTO: 34.1 PG (ref 27–33)
MCHC RBC AUTO-ENTMCNC: 34.6 G/DL (ref 32.2–35.5)
MCV RBC AUTO: 98.4 FL (ref 81.4–97.8)
MONOCYTES # BLD AUTO: 0.33 K/UL (ref 0–0.85)
MONOCYTES NFR BLD AUTO: 5.7 % (ref 0–13.4)
NEUTROPHILS # BLD AUTO: 2.93 K/UL (ref 1.82–7.42)
NEUTROPHILS NFR BLD: 50.1 % (ref 44–72)
NRBC # BLD AUTO: 0 K/UL
NRBC BLD-RTO: 0 /100 WBC (ref 0–0.2)
PLATELET # BLD AUTO: 240 K/UL (ref 164–446)
PMV BLD AUTO: 10.9 FL (ref 9–12.9)
POTASSIUM SERPL-SCNC: 4.1 MMOL/L (ref 3.6–5.5)
PROT SERPL-MCNC: 7.5 G/DL (ref 6–8.2)
RBC # BLD AUTO: 3.67 M/UL (ref 4.2–5.4)
SODIUM SERPL-SCNC: 139 MMOL/L (ref 135–145)
TRIGL SERPL-MCNC: 531 MG/DL (ref 0–149)
WBC # BLD AUTO: 5.8 K/UL (ref 4.8–10.8)

## 2023-08-10 PROCEDURE — 36415 COLL VENOUS BLD VENIPUNCTURE: CPT

## 2023-08-10 PROCEDURE — 3080F DIAST BP >= 90 MM HG: CPT | Performed by: PHYSICIAN ASSISTANT

## 2023-08-10 PROCEDURE — 99214 OFFICE O/P EST MOD 30 MIN: CPT | Performed by: PHYSICIAN ASSISTANT

## 2023-08-10 PROCEDURE — 85025 COMPLETE CBC W/AUTO DIFF WBC: CPT

## 2023-08-10 PROCEDURE — 3077F SYST BP >= 140 MM HG: CPT | Performed by: PHYSICIAN ASSISTANT

## 2023-08-10 PROCEDURE — 83036 HEMOGLOBIN GLYCOSYLATED A1C: CPT

## 2023-08-10 PROCEDURE — 80053 COMPREHEN METABOLIC PANEL: CPT

## 2023-08-10 PROCEDURE — 80061 LIPID PANEL: CPT

## 2023-08-10 RX ORDER — LOSARTAN POTASSIUM 100 MG/1
100 TABLET ORAL DAILY
Qty: 90 TABLET | Refills: 1 | Status: SHIPPED | OUTPATIENT
Start: 2023-08-10 | End: 2024-02-06 | Stop reason: SDUPTHER

## 2023-08-10 RX ORDER — ATORVASTATIN CALCIUM 40 MG/1
40 TABLET, FILM COATED ORAL NIGHTLY
Qty: 90 TABLET | Refills: 1 | Status: SHIPPED | OUTPATIENT
Start: 2023-08-10 | End: 2024-02-06 | Stop reason: SDUPTHER

## 2023-08-10 ASSESSMENT — PATIENT HEALTH QUESTIONNAIRE - PHQ9: CLINICAL INTERPRETATION OF PHQ2 SCORE: 0

## 2023-08-10 ASSESSMENT — FIBROSIS 4 INDEX: FIB4 SCORE: 1.63

## 2023-08-10 NOTE — PROGRESS NOTES
"Subjective:   Ana Sherwood is a 58 y.o. female here today for     HPI:Patient is here to discuss:    Problem   Hypertriglyceridemia   Prediabetes   Pure Hypercholesterolemia   Encounter for Medical Examination to Establish Care       ROS   No headaches, chest pain, no shortness of breath, abdominal pain, nausea, or vomiting.  All other systems were reviewed and are negative or noted as positive in the HPI.     Objective:     BP (!) 156/96   Pulse 60   Temp 36.5 °C (97.7 °F) (Temporal)   Resp 18   Ht 1.698 m (5' 6.85\")   Wt 108 kg (238 lb 3.2 oz)   SpO2 93%  Body mass index is 37.48 kg/m².     Physical Exam:  General: Patient appears well-nourished, well-hydrated, nontoxic  HEENT, normocephalic atraumatic, PERRLA, extraocular movements intact, nares are patent and clear  Neck: No visible masses, thyromegaly or abnormalities noted  Cardiovascular.  Sitting comfortably without visible signs of edema  Lungs: No cyanosis noted, nondyspneic  Skin: Well perfused without evidence of rash or lesions  Neurological: Cranial nerves II through XII intact, normal gait  Musculoskeletal: Normal range of motion, normal strength and no deficit noted     Clinical Course/Lab Analysis:  No visits with results within 1 Month(s) from this visit.   Latest known visit with results is:   Hospital Outpatient Visit on 02/20/2023   Component Date Value Ref Range Status    Alkaline Phosphatase 02/20/2023 95  30 - 99 U/L Final    AST(SGOT) 02/20/2023 35  12 - 45 U/L Final    ALT(SGPT) 02/20/2023 33  2 - 50 U/L Final    Total Bilirubin 02/20/2023 0.5  0.1 - 1.5 mg/dL Final    Direct Bilirubin 02/20/2023 <0.2  0.1 - 0.5 mg/dL Final    Indirect Bilirubin 02/20/2023 see below  0.0 - 1.0 mg/dL Final    Albumin 02/20/2023 4.4  3.2 - 4.9 g/dL Final    Total Protein 02/20/2023 7.3  6.0 - 8.2 g/dL Final       Latest Reference Range & Units 02/20/23 14:28   WBC 4.8 - 10.8 K/uL 5.4   RBC 4.20 - 5.40 M/uL 3.63 (L)   Hemoglobin 12.0 - 16.0 g/dL " 12.1   Hematocrit 37.0 - 47.0 % 35.1 (L)   MCV 81.4 - 97.8 fL 96.7   MCH 27.0 - 33.0 pg 33.3 (H)   MCHC 33.6 - 35.0 g/dL 34.5   RDW 35.9 - 50.0 fL 45.9   Platelet Count 164 - 446 K/uL 209   MPV 9.0 - 12.9 fL 10.7   Neutrophils-Polys 44.00 - 72.00 % 48.30   Neutrophils (Absolute) 2.00 - 7.15 K/uL 2.59   Lymphocytes 22.00 - 41.00 % 41.00   Lymphs (Absolute) 1.00 - 4.80 K/uL 2.20   Monocytes 0.00 - 13.40 % 6.50   Monos (Absolute) 0.00 - 0.85 K/uL 0.35   Eosinophils 0.00 - 6.90 % 2.40   Eos (Absolute) 0.00 - 0.51 K/uL 0.13   Basophils 0.00 - 1.80 % 1.10   Baso (Absolute) 0.00 - 0.12 K/uL 0.06   Immature Granulocytes 0.00 - 0.90 % 0.70   Immature Granulocytes (abs) 0.00 - 0.11 K/uL 0.04   Nucleated RBC /100 WBC 0.00   NRBC (Absolute) K/uL 0.00   Sodium 135 - 145 mmol/L 139   Potassium 3.6 - 5.5 mmol/L 3.9   Chloride 96 - 112 mmol/L 101   Co2 20 - 33 mmol/L 25   Anion Gap 7.0 - 16.0  13.0   Glucose 65 - 99 mg/dL 98   Bun 8 - 22 mg/dL 18   Creatinine 0.50 - 1.40 mg/dL 0.88   GFR (CKD-EPI) >60 mL/min/1.73 m 2 77   Calcium 8.4 - 10.2 mg/dL 9.6   Correct Calcium 8.5 - 10.5 mg/dL 9.3   AST(SGOT) 12 - 45 U/L 35   ALT(SGPT) 2 - 50 U/L 34   Alkaline Phosphatase 30 - 99 U/L 97   Total Bilirubin 0.1 - 1.5 mg/dL 0.5   Albumin 3.2 - 4.9 g/dL 4.4   Total Protein 6.0 - 8.2 g/dL 7.3   Globulin 1.9 - 3.5 g/dL 2.9   A-G Ratio g/dL 1.5   Glycohemoglobin 4.0 - 5.6 % 5.9 (H)   Estim. Avg Glu mg/dL 123   Fasting Status  Fasting   Cholesterol,Tot 100 - 199 mg/dL 226 (H)   Triglycerides 0 - 149 mg/dL 451 (H)   HDL >=40 mg/dL 50   LDL <100 mg/dL see below   (L): Data is abnormally low  (H): Data is abnormally high    Assessment and Plan:   The following treatment plan was discussed.  Signs and symptoms for which to return were discussed with patient at length.  Patient verbalized understanding.    1. Essential hypertension  losartan (COZAAR) 100 MG Tab    Comp Metabolic Panel      2. Mixed hyperlipidemia        3. Pure hypercholesterolemia   atorvastatin (LIPITOR) 40 MG Tab      4. Prediabetes  Comp Metabolic Panel    HEMOGLOBIN A1C      5. Hypertriglyceridemia  Lipid Profile      6. Gastroesophageal reflux disease without esophagitis  esomeprazole (NEXIUM) 20 MG capsule      7. Other fatigue  CBC WITH DIFFERENTIAL      8. Encounter for medical examination to establish care            Patient is new to me.  She comes in with multiple chronic conditions.  She needs medications refilled which I did.  These conditions are chronic and unstable.  I am ordering labs and I will review labs and likely have her follow-up.  I did send medication to her pharmacy.  Discussed that I would like to see her in clinic and recommended annual examination.    1.  Hyperlipidemia, chronic and stable atorvastatin refilled  2.  GERD: Chronic and unstable refilled Nexium,  3. Hypertension chronic and unstable refilled losartan.  She is currently  Followup:  4.  Hypertriglyceridemia.  Patient's triglycerides were over 400 in February.  Rechecking and discussed that she likely needs Tricor.  Discussed risk of acute pancreatitis and its very.  She does not alcohol and recommend stopping and to walk daily.  She reports cutting back on her alcohol intake in the last few months for this is good and hopefully will be reflected in her next lipid panel    Please note that this dictation was created using voice recognition software. I have made every reasonable attempt to correct obvious errors, but I expect that there are errors of grammar and possibly content that I did not discover before finalizing the note.

## 2023-08-11 LAB
EST. AVERAGE GLUCOSE BLD GHB EST-MCNC: 120 MG/DL
HBA1C MFR BLD: 5.8 % (ref 4–5.6)

## 2023-08-14 ENCOUNTER — TELEPHONE (OUTPATIENT)
Dept: MEDICAL GROUP | Facility: MEDICAL CENTER | Age: 58
End: 2023-08-14
Payer: COMMERCIAL

## 2023-08-14 NOTE — TELEPHONE ENCOUNTER
----- Message from Alin Hodge P.A.-C. sent at 8/14/2023  2:48 PM PDT -----  Please contact Ana.  She needs an appointment with her PCP to discuss labs.  Thank you.    Alin Hodge PA-C

## 2023-08-16 ENCOUNTER — TELEPHONE (OUTPATIENT)
Dept: MEDICAL GROUP | Facility: MEDICAL CENTER | Age: 58
End: 2023-08-16
Payer: COMMERCIAL

## 2023-08-16 NOTE — TELEPHONE ENCOUNTER
Phone Number Called: [phone]    Call outcome: Did not leave a detailed message. Requested patient to call back.    Message: 2/3 phone call attempts to contact pt in regards of lab results. Left pt a razia.

## 2023-08-18 ENCOUNTER — TELEPHONE (OUTPATIENT)
Dept: MEDICAL GROUP | Facility: MEDICAL CENTER | Age: 58
End: 2023-08-18
Payer: COMMERCIAL

## 2023-08-18 NOTE — TELEPHONE ENCOUNTER
Phone Number Called: [phone]    Call outcome: Did not leave a detailed message. Requested patient to call back.    Message: 3/3 phone call attempts to contact pt in regards of lab results. Left pt a vm. Letter has been mailed.

## 2023-08-18 NOTE — LETTER
August 18, 2023        Ana Sherwood  [address] [place] [address]        Dear Ana    Our office attempted to contact you multiple times in regards of your recent lab results. Please schedule an appointment with your provider to go over your recent lab results. To schedule an appointment please contact our scheduling department at [phone].        Sincerely,        Roly Mc Ass't  Signed Electronically

## 2023-08-22 ENCOUNTER — TELEMEDICINE (OUTPATIENT)
Dept: MEDICAL GROUP | Facility: MEDICAL CENTER | Age: 58
End: 2023-08-22
Payer: COMMERCIAL

## 2023-08-22 DIAGNOSIS — E66.9 OBESITY (BMI 30-39.9): ICD-10-CM

## 2023-08-22 DIAGNOSIS — K21.9 GASTROESOPHAGEAL REFLUX DISEASE WITHOUT ESOPHAGITIS: ICD-10-CM

## 2023-08-22 DIAGNOSIS — I10 ESSENTIAL HYPERTENSION: ICD-10-CM

## 2023-08-22 DIAGNOSIS — Z12.12 SCREENING FOR COLORECTAL CANCER: ICD-10-CM

## 2023-08-22 DIAGNOSIS — E78.1 HYPERTRIGLYCERIDEMIA: ICD-10-CM

## 2023-08-22 DIAGNOSIS — Z12.11 SCREENING FOR COLORECTAL CANCER: ICD-10-CM

## 2023-08-22 DIAGNOSIS — R73.03 PREDIABETES: ICD-10-CM

## 2023-08-22 DIAGNOSIS — E78.2 MIXED HYPERLIPIDEMIA: ICD-10-CM

## 2023-08-22 PROCEDURE — 99214 OFFICE O/P EST MOD 30 MIN: CPT | Performed by: PHYSICIAN ASSISTANT

## 2023-08-22 RX ORDER — FENOFIBRATE 145 MG/1
145 TABLET, COATED ORAL DAILY
Qty: 90 TABLET | Refills: 3 | Status: SHIPPED | OUTPATIENT
Start: 2023-08-22 | End: 2024-02-06 | Stop reason: SDUPTHER

## 2023-08-22 RX ORDER — ESOMEPRAZOLE MAGNESIUM 10 MG/1
GRANULE, FOR SUSPENSION, EXTENDED RELEASE ORAL
COMMUNITY

## 2023-08-22 RX ORDER — CHOLECALCIFEROL (VITAMIN D3) 50 MCG
TABLET ORAL
COMMUNITY

## 2023-08-22 RX ORDER — RIBOFLAVIN (VITAMIN B2) 100 MG
TABLET ORAL
COMMUNITY
End: 2023-08-22

## 2023-08-22 RX ORDER — UBIDECARENONE 75 MG
CAPSULE ORAL
COMMUNITY

## 2023-08-22 RX ORDER — AZITHROMYCIN 250 MG/1
TABLET, FILM COATED ORAL
COMMUNITY
End: 2023-08-22

## 2023-08-22 NOTE — PROGRESS NOTES
Subjective:   Ana Sherwood is a 58 y.o. female here today for     HPI:Patient is here to discuss:    Lab results    ROS   No headaches, chest pain, no shortness of breath, abdominal pain, nausea, or vomiting.  All other systems were reviewed and are negative or noted as positive in the HPI.     Objective:     There were no vitals taken for this visit. There is no height or weight on file to calculate BMI.     Physical Exam:  General: Patient appears well-nourished, well-hydrated, nontoxic  HEENT, normocephalic atraumatic, PERRLA, extraocular movements intact, nares are patent and clear  Neck: No visible masses, thyromegaly or abnormalities noted  Cardiovascular.  Sitting comfortably without visible signs of edema  Lungs: No cyanosis noted, nondyspneic  Skin: Well perfused without evidence of rash or lesions  Neurological: Cranial nerves II through XII intact, normal gait  Musculoskeletal: Normal range of motion, normal strength and no deficit noted     Clinical Course/Lab Analysis:  Hospital Outpatient Visit on 08/10/2023   Component Date Value Ref Range Status    WBC 08/10/2023 5.8  4.8 - 10.8 K/uL Final    RBC 08/10/2023 3.67 (L)  4.20 - 5.40 M/uL Final    Hemoglobin 08/10/2023 12.5  12.0 - 16.0 g/dL Final    Hematocrit 08/10/2023 36.1 (L)  37.0 - 47.0 % Final    MCV 08/10/2023 98.4 (H)  81.4 - 97.8 fL Final    MCH 08/10/2023 34.1 (H)  27.0 - 33.0 pg Final    MCHC 08/10/2023 34.6  32.2 - 35.5 g/dL Final    RDW 08/10/2023 46.9  35.9 - 50.0 fL Final    Platelet Count 08/10/2023 240  164 - 446 K/uL Final    MPV 08/10/2023 10.9  9.0 - 12.9 fL Final    Neutrophils-Polys 08/10/2023 50.10  44.00 - 72.00 % Final    Lymphocytes 08/10/2023 39.30  22.00 - 41.00 % Final    Monocytes 08/10/2023 5.70  0.00 - 13.40 % Final    Eosinophils 08/10/2023 3.30  0.00 - 6.90 % Final    Basophils 08/10/2023 0.90  0.00 - 1.80 % Final    Immature Granulocytes 08/10/2023 0.70  0.00 - 0.90 % Final    Nucleated RBC 08/10/2023 0.00   0.00 - 0.20 /100 WBC Final    Neutrophils (Absolute) 08/10/2023 2.93  1.82 - 7.42 K/uL Final    Lymphs (Absolute) 08/10/2023 2.29  1.00 - 4.80 K/uL Final    Monos (Absolute) 08/10/2023 0.33  0.00 - 0.85 K/uL Final    Eos (Absolute) 08/10/2023 0.19  0.00 - 0.51 K/uL Final    Baso (Absolute) 08/10/2023 0.05  0.00 - 0.12 K/uL Final    Immature Granulocytes (abs) 08/10/2023 0.04  0.00 - 0.11 K/uL Final    NRBC (Absolute) 08/10/2023 0.00  K/uL Final    Cholesterol,Tot 08/10/2023 234 (H)  100 - 199 mg/dL Final    Triglycerides 08/10/2023 531 (H)  0 - 149 mg/dL Final    HDL 08/10/2023 52  >=40 mg/dL Final    LDL 08/10/2023 see below  <100 mg/dL Final    Glycohemoglobin 08/10/2023 5.8 (H)  4.0 - 5.6 % Final    Est Avg Glucose 08/10/2023 120  mg/dL Final    Sodium 08/10/2023 139  135 - 145 mmol/L Final    Potassium 08/10/2023 4.1  3.6 - 5.5 mmol/L Final    Chloride 08/10/2023 104  96 - 112 mmol/L Final    Co2 08/10/2023 23  20 - 33 mmol/L Final    Anion Gap 08/10/2023 12.0  7.0 - 16.0 Final    Glucose 08/10/2023 97  65 - 99 mg/dL Final    Bun 08/10/2023 18  8 - 22 mg/dL Final    Creatinine 08/10/2023 0.84  0.50 - 1.40 mg/dL Final    Calcium 08/10/2023 9.9  8.4 - 10.2 mg/dL Final    Correct Calcium 08/10/2023 9.3  8.5 - 10.5 mg/dL Final    AST(SGOT) 08/10/2023 33  12 - 45 U/L Final    ALT(SGPT) 08/10/2023 31  2 - 50 U/L Final    Alkaline Phosphatase 08/10/2023 112 (H)  30 - 99 U/L Final    Total Bilirubin 08/10/2023 0.4  0.1 - 1.5 mg/dL Final    Albumin 08/10/2023 4.7  3.2 - 4.9 g/dL Final    Total Protein 08/10/2023 7.5  6.0 - 8.2 g/dL Final    Globulin 08/10/2023 2.8  1.9 - 3.5 g/dL Final    A-G Ratio 08/10/2023 1.7  g/dL Final    Fasting Status 08/10/2023 Fasting   Final    GFR (CKD-EPI) 08/10/2023 81  >60 mL/min/1.73 m 2 Final          Assessment and Plan:   The following treatment plan was discussed.  Signs and symptoms for which to return were discussed with patient at length.  Patient verbalized understanding.    1.  Mixed hyperlipidemia        2. Gastroesophageal reflux disease without esophagitis        3. Hypertriglyceridemia  fenofibrate (TRICOR) 145 MG Tab    Lipid Profile    Referral to Nutrition Services      4. Essential hypertension        5. Prediabetes        6. Obesity (BMI 30-39.9)  Referral to Nutrition Services      7. Screening for colorectal cancer  COLOGUARD (FIT DNA)              1.  Hyperlipidemia, chronic and stable atorvastatin refilled  2.  GERD: Chronic and unstable refilled Nexium,  3. Hypertension chronic and unstable refilled losartan.   4.  Hypertriglyceridemia.  Patient's triglycerides were over 400 in February.  An hour over 500 in August 2020.  Starting Tricor.  Continue atorvastatin.  Recommend omega-3 supplement daily.  Possibly alcohol and exercise.  Discussed risk of pancreatitis at length  5. Prediabetes:  hbga1c was 5.8%    This evaluation was conducted via Zoom using secure and encrypted videoconferencing technology. The patient was in their home in the [facility].    The patient's identity was confirmed and verbal consent was obtained for this virtual visit.     Please note that this dictation was created using voice recognition software. I have made every reasonable attempt to correct obvious errors, but I expect that there are errors of grammar and possibly content that I did not discover before finalizing the note.

## 2024-02-06 DIAGNOSIS — E78.00 PURE HYPERCHOLESTEROLEMIA: ICD-10-CM

## 2024-02-06 DIAGNOSIS — E78.1 HYPERTRIGLYCERIDEMIA: ICD-10-CM

## 2024-02-06 DIAGNOSIS — K21.9 GASTROESOPHAGEAL REFLUX DISEASE WITHOUT ESOPHAGITIS: ICD-10-CM

## 2024-02-06 DIAGNOSIS — I10 ESSENTIAL HYPERTENSION: ICD-10-CM

## 2024-02-07 NOTE — TELEPHONE ENCOUNTER
Received request via: Pharmacy    Was the patient seen in the last year in this department? Yes    Does the patient have an active prescription (recently filled or refills available) for medication(s) requested? No    Pharmacy Name: [facility]     Does the patient have assisted Plus and need 100 day supply (blood pressure, diabetes and cholesterol meds only)? Patient does not have SCP

## 2024-02-08 RX ORDER — ATORVASTATIN CALCIUM 40 MG/1
40 TABLET, FILM COATED ORAL NIGHTLY
Qty: 90 TABLET | Refills: 1 | Status: SHIPPED | OUTPATIENT
Start: 2024-02-08

## 2024-02-08 RX ORDER — FENOFIBRATE 145 MG/1
145 TABLET, COATED ORAL DAILY
Qty: 90 TABLET | Refills: 3 | Status: SHIPPED | OUTPATIENT
Start: 2024-02-08

## 2024-02-08 RX ORDER — LOSARTAN POTASSIUM 100 MG/1
100 TABLET ORAL DAILY
Qty: 90 TABLET | Refills: 1 | Status: SHIPPED | OUTPATIENT
Start: 2024-02-08

## 2024-02-16 ENCOUNTER — TELEPHONE (OUTPATIENT)
Dept: MEDICAL GROUP | Facility: MEDICAL CENTER | Age: 59
End: 2024-02-16
Payer: COMMERCIAL

## 2024-02-16 NOTE — TELEPHONE ENCOUNTER
Phone Number Called: [phone]    Call outcome: Left detailed message for patient. Informed to call back with any additional questions.    Message: LVM for patient asking to call back to discuss/clarify which medications needed to be refilled and which pharmacy she wants them sent to.

## 2024-06-10 DIAGNOSIS — K21.9 GASTROESOPHAGEAL REFLUX DISEASE WITHOUT ESOPHAGITIS: ICD-10-CM

## 2024-06-10 DIAGNOSIS — E78.00 PURE HYPERCHOLESTEROLEMIA: ICD-10-CM

## 2024-06-10 DIAGNOSIS — I10 ESSENTIAL HYPERTENSION: ICD-10-CM

## 2024-06-12 RX ORDER — LOSARTAN POTASSIUM 100 MG/1
100 TABLET ORAL DAILY
Qty: 90 TABLET | Refills: 1 | Status: SHIPPED | OUTPATIENT
Start: 2024-06-12

## 2024-06-12 RX ORDER — ATORVASTATIN CALCIUM 40 MG/1
40 TABLET, FILM COATED ORAL EVERY EVENING
Qty: 90 TABLET | Refills: 1 | Status: SHIPPED | OUTPATIENT
Start: 2024-06-12

## 2024-06-12 NOTE — TELEPHONE ENCOUNTER
Received request via: Pharmacy    Was the patient seen in the last year in this department? Yes    Does the patient have an active prescription (recently filled or refills available) for medication(s) requested? No    Pharmacy Name: [facility] [place] - [place], [place] - [address] [place] [address]     Does the patient have [facility] and need 100 day supply (blood pressure, diabetes and cholesterol meds only)? Patient does not have SCP

## 2024-06-13 ENCOUNTER — HOSPITAL ENCOUNTER (OUTPATIENT)
Dept: LAB | Facility: MEDICAL CENTER | Age: 59
End: 2024-06-13
Attending: INTERNAL MEDICINE
Payer: COMMERCIAL

## 2024-06-13 ENCOUNTER — HOSPITAL ENCOUNTER (OUTPATIENT)
Dept: LAB | Facility: MEDICAL CENTER | Age: 59
End: 2024-06-13
Attending: PHYSICIAN ASSISTANT
Payer: COMMERCIAL

## 2024-06-13 DIAGNOSIS — E78.1 HYPERTRIGLYCERIDEMIA: ICD-10-CM

## 2024-06-13 LAB
ALBUMIN SERPL BCP-MCNC: 4.6 G/DL (ref 3.2–4.9)
ALP SERPL-CCNC: 36 U/L (ref 30–99)
ALT SERPL-CCNC: 46 U/L (ref 2–50)
AST SERPL-CCNC: 59 U/L (ref 12–45)
BILIRUB CONJ SERPL-MCNC: <0.2 MG/DL (ref 0.1–0.5)
BILIRUB INDIRECT SERPL-MCNC: ABNORMAL MG/DL (ref 0–1)
BILIRUB SERPL-MCNC: 0.4 MG/DL (ref 0.1–1.5)
CHOLEST SERPL-MCNC: 182 MG/DL (ref 100–199)
HDLC SERPL-MCNC: 41 MG/DL
LDLC SERPL CALC-MCNC: 75 MG/DL
PROT SERPL-MCNC: 7.2 G/DL (ref 6–8.2)
TRIGL SERPL-MCNC: 328 MG/DL (ref 0–149)

## 2024-06-13 PROCEDURE — 82247 BILIRUBIN TOTAL: CPT

## 2024-06-13 PROCEDURE — 83010 ASSAY OF HAPTOGLOBIN QUANT: CPT

## 2024-06-13 PROCEDURE — 82172 ASSAY OF APOLIPOPROTEIN: CPT

## 2024-06-13 PROCEDURE — 82977 ASSAY OF GGT: CPT

## 2024-06-13 PROCEDURE — 36415 COLL VENOUS BLD VENIPUNCTURE: CPT

## 2024-06-13 PROCEDURE — 84460 ALANINE AMINO (ALT) (SGPT): CPT

## 2024-06-13 PROCEDURE — 80061 LIPID PANEL: CPT

## 2024-06-13 PROCEDURE — 83883 ASSAY NEPHELOMETRY NOT SPEC: CPT

## 2024-06-13 PROCEDURE — 84478 ASSAY OF TRIGLYCERIDES: CPT

## 2024-06-13 PROCEDURE — 82465 ASSAY BLD/SERUM CHOLESTEROL: CPT

## 2024-06-13 PROCEDURE — 82947 ASSAY GLUCOSE BLOOD QUANT: CPT

## 2024-06-13 PROCEDURE — 80076 HEPATIC FUNCTION PANEL: CPT

## 2024-06-13 PROCEDURE — 84450 TRANSFERASE (AST) (SGOT): CPT

## 2024-06-18 LAB
A2 MACROGLOB SERPL-MCNC: 127 MG/DL (ref 110–276)
ALT SERPL W P-5'-P-CCNC: 48 IU/L (ref 0–40)
APO A-I SERPL-MCNC: 153 MG/DL (ref 116–209)
AST SERPL W P-5'-P-CCNC: 60 IU/L (ref 0–40)
BILIRUB SERPL-MCNC: 0.3 MG/DL (ref 0–1.2)
CHOLEST SERPL-MCNC: 195 MG/DL (ref 100–199)
FIBROSIS SCORING 550107: ABNORMAL
FIBROSIS STAGE SERPL QL: ABNORMAL
GGT SERPL-CCNC: 43 IU/L (ref 0–60)
GLUCOSE SERPL-MCNC: 106 MG/DL (ref 70–99)
HAPTOGLOB SERPL-MCNC: 82 MG/DL (ref 33–346)
LABORATORY COMMENT REPORT: ABNORMAL
LIVER FIBR SCORE SERPL CALC.FIBROSURE: 0.09 (ref 0–0.21)
LIVER STEATOSIS GRADE SERPL QL: ABNORMAL
LIVER STEATOSIS SCORE SERPL: 0.79 (ref 0–0.4)
NASH GRADE SERPL QL: ABNORMAL
NASH INTERPRETATION SERPL-IMP: ABNORMAL
NASH SCORE SERPL: 0.73 (ref 0–0.25)
NASH SCORING Z4789: ABNORMAL
STEATOSIS SCORING NL11718: ABNORMAL
TEST PERFORMANCE INFO SPEC: ABNORMAL
TEST PERFORMANCE INFO SPEC: ABNORMAL
TRIGL SERPL-MCNC: 339 MG/DL (ref 0–149)

## 2024-12-13 ENCOUNTER — HOSPITAL ENCOUNTER (OUTPATIENT)
Dept: RADIOLOGY | Facility: MEDICAL CENTER | Age: 59
End: 2024-12-13
Attending: PHYSICIAN ASSISTANT
Payer: COMMERCIAL

## 2024-12-13 ENCOUNTER — HOSPITAL ENCOUNTER (OUTPATIENT)
Dept: RADIOLOGY | Facility: MEDICAL CENTER | Age: 59
End: 2024-12-13
Attending: OTOLARYNGOLOGY
Payer: COMMERCIAL

## 2024-12-13 DIAGNOSIS — Z12.31 VISIT FOR SCREENING MAMMOGRAM: ICD-10-CM

## 2024-12-13 DIAGNOSIS — J31.2 CHRONIC PHARYNGITIS: ICD-10-CM

## 2024-12-13 DIAGNOSIS — F45.8 OTHER SOMATOFORM DISORDERS: ICD-10-CM

## 2024-12-13 PROCEDURE — 77067 SCR MAMMO BI INCL CAD: CPT

## 2024-12-13 PROCEDURE — 70490 CT SOFT TISSUE NECK W/O DYE: CPT

## 2025-01-24 ENCOUNTER — TELEPHONE (OUTPATIENT)
Dept: MEDICAL GROUP | Facility: MEDICAL CENTER | Age: 60
End: 2025-01-24
Payer: COMMERCIAL

## 2025-01-30 ENCOUNTER — TELEMEDICINE (OUTPATIENT)
Dept: MEDICAL GROUP | Facility: MEDICAL CENTER | Age: 60
End: 2025-01-30
Payer: COMMERCIAL

## 2025-01-30 VITALS — WEIGHT: 229 LBS | BODY MASS INDEX: 36.8 KG/M2 | HEIGHT: 66 IN

## 2025-01-30 DIAGNOSIS — E78.1 HYPERTRIGLYCERIDEMIA: ICD-10-CM

## 2025-01-30 DIAGNOSIS — R73.03 PREDIABETES: ICD-10-CM

## 2025-01-30 DIAGNOSIS — E78.00 PURE HYPERCHOLESTEROLEMIA: ICD-10-CM

## 2025-01-30 DIAGNOSIS — I10 ESSENTIAL HYPERTENSION: ICD-10-CM

## 2025-01-30 DIAGNOSIS — E78.2 MIXED HYPERLIPIDEMIA: ICD-10-CM

## 2025-01-30 DIAGNOSIS — R53.83 OTHER FATIGUE: ICD-10-CM

## 2025-01-30 DIAGNOSIS — K21.9 GASTROESOPHAGEAL REFLUX DISEASE WITHOUT ESOPHAGITIS: ICD-10-CM

## 2025-01-30 DIAGNOSIS — E55.9 VITAMIN D INSUFFICIENCY: ICD-10-CM

## 2025-01-30 DIAGNOSIS — E66.9 OBESITY (BMI 30-39.9): ICD-10-CM

## 2025-01-30 RX ORDER — PREDNISONE 10 MG/1
TABLET ORAL
COMMUNITY
Start: 2024-11-04 | End: 2025-01-30

## 2025-01-30 RX ORDER — LOSARTAN POTASSIUM 100 MG/1
100 TABLET ORAL DAILY
Qty: 90 TABLET | Refills: 1 | Status: SHIPPED | OUTPATIENT
Start: 2025-01-30

## 2025-01-30 RX ORDER — ATORVASTATIN CALCIUM 40 MG/1
40 TABLET, FILM COATED ORAL EVERY EVENING
Qty: 90 TABLET | Refills: 1 | Status: SHIPPED | OUTPATIENT
Start: 2025-01-30

## 2025-01-30 RX ORDER — AZITHROMYCIN 250 MG/1
TABLET, FILM COATED ORAL
COMMUNITY
End: 2025-01-30

## 2025-01-30 RX ORDER — FENOFIBRATE 145 MG/1
145 TABLET, COATED ORAL DAILY
Qty: 90 TABLET | Refills: 1 | Status: SHIPPED | OUTPATIENT
Start: 2025-01-30

## 2025-01-30 RX ORDER — AMOXICILLIN 875 MG/1
TABLET, COATED ORAL
COMMUNITY
Start: 2024-11-04 | End: 2025-01-30

## 2025-01-30 ASSESSMENT — FIBROSIS 4 INDEX: FIB4 SCORE: 2.06

## 2025-01-30 NOTE — PROGRESS NOTES
"Subjective:   Ana Sherwood is a 59 y.o. female here today for medication refills    HPI:Patient is here to discuss: Medication refills.  She has a history of hypertension hypertriglyceridemia GERD prediabetes hyperlipidemia needs medication refill notes that she has not been into the office since 2023 I did explain to her that patient needs to be seen yearly for medication refills.  Feels fine denies chest pain shortness of breath or any problems    ROS   No headaches, chest pain, no shortness of breath, abdominal pain, nausea, or vomiting.  All other systems were reviewed and are negative or noted as positive in the HPI.     Objective:     Ht 1.676 m (5' 6\")   Wt 104 kg (229 lb)  Body mass index is 36.96 kg/m².     Physical Exam:  General: Patient appears well-nourished, well-hydrated, nontoxic  HEENT, normocephalic atraumatic, PERRLA, extraocular movements intact, nares are patent and clear  Neck: No visible masses, thyromegaly or abnormalities noted  Cardiovascular.  Sitting comfortably without visible signs of edema  Lungs: No cyanosis noted, nondyspneic  Skin: Well perfused without evidence of rash or lesions  Neurological: Cranial nerves II through XII intact, normal gait  Musculoskeletal: Normal range of motion, normal strength and no deficit noted     Clinical Course/Lab Analysis:        Assessment and Plan:   The following treatment plan was discussed.  Signs and symptoms for which to return were discussed with patient at length.  Patient verbalized understanding.    1. Hypertriglyceridemia  LIPID PANEL    fenofibrate (TRICOR) 145 MG Tab      2. Essential hypertension  Comp Metabolic Panel    losartan (COZAAR) 100 MG Tab      3. Gastroesophageal reflux disease without esophagitis  esomeprazole (NEXIUM) 20 MG capsule      4. Mixed hyperlipidemia  LIPID PANEL      5. Obesity (BMI 30-39.9)        6. Prediabetes  HEMOGLOBIN A1C      7. Other fatigue  CBC WITH DIFFERENTIAL    TSH WITH REFLEX TO FT4    "   8. Vitamin D insufficiency  VITAMIN D 25-HYDROXY      9. Pure hypercholesterolemia  atorvastatin (LIPITOR) 40 MG Tab          Refilled all patient's medications for 6 months and told her she needs to get into the clinic so I can check her blood pressure and make sure she is on the appropriate dose of losartan.  She also needs to get her labs drawn prior to any other refills      () Today's E/M visit is associated with medical care services that serve as the continuing focal point for all needed health care services and/or with medical care services that are part of ongoing care related to a patient's single, serious condition, or a complex condition: This includes furnishing services to patients on an ongoing basis that result in care that is personalized to the patient. The services result in a comprehensive, longitudinal, and continuous relationship with the patient and involve delivery of team-based care that is accessible, coordinated with other practitioners and providers, and integrated with the broader health care landscape.      This evaluation was conducted via Teams using secure and encrypted videoconferencing technology. The [place]    The patient's identity was confirmed and verbal consent was obtained for this virtual visit.      Followup: 5-6 months    Please note that this dictation was created using voice recognition software. I have made every reasonable attempt to correct obvious errors, but I expect that there are errors of grammar and possibly content that I did not discover before finalizing the note.

## 2025-06-19 DIAGNOSIS — E78.00 PURE HYPERCHOLESTEROLEMIA: ICD-10-CM

## 2025-06-19 DIAGNOSIS — I10 ESSENTIAL HYPERTENSION: ICD-10-CM

## 2025-06-19 DIAGNOSIS — E78.1 HYPERTRIGLYCERIDEMIA: ICD-10-CM

## 2025-06-20 RX ORDER — FENOFIBRATE 145 MG/1
145 TABLET, FILM COATED ORAL DAILY
Qty: 90 TABLET | Refills: 0 | Status: SHIPPED | OUTPATIENT
Start: 2025-06-20

## 2025-06-20 RX ORDER — LOSARTAN POTASSIUM 100 MG/1
100 TABLET ORAL DAILY
Qty: 90 TABLET | Refills: 0 | Status: SHIPPED | OUTPATIENT
Start: 2025-06-20

## 2025-06-20 RX ORDER — ATORVASTATIN CALCIUM 40 MG/1
40 TABLET, FILM COATED ORAL EVERY EVENING
Qty: 90 TABLET | Refills: 0 | Status: SHIPPED | OUTPATIENT
Start: 2025-06-20